# Patient Record
Sex: FEMALE | Race: OTHER | NOT HISPANIC OR LATINO
[De-identification: names, ages, dates, MRNs, and addresses within clinical notes are randomized per-mention and may not be internally consistent; named-entity substitution may affect disease eponyms.]

---

## 2017-03-09 ENCOUNTER — APPOINTMENT (OUTPATIENT)
Dept: INTERNAL MEDICINE | Facility: CLINIC | Age: 32
End: 2017-03-09

## 2017-04-03 ENCOUNTER — TRANSCRIPTION ENCOUNTER (OUTPATIENT)
Age: 32
End: 2017-04-03

## 2017-04-16 ENCOUNTER — TRANSCRIPTION ENCOUNTER (OUTPATIENT)
Age: 32
End: 2017-04-16

## 2017-04-17 ENCOUNTER — APPOINTMENT (OUTPATIENT)
Dept: INTERNAL MEDICINE | Facility: CLINIC | Age: 32
End: 2017-04-17

## 2017-12-05 ENCOUNTER — APPOINTMENT (OUTPATIENT)
Dept: OBGYN | Facility: CLINIC | Age: 32
End: 2017-12-05
Payer: MEDICAID

## 2017-12-05 ENCOUNTER — ASOB RESULT (OUTPATIENT)
Age: 32
End: 2017-12-05

## 2017-12-05 VITALS
DIASTOLIC BLOOD PRESSURE: 71 MMHG | HEIGHT: 61 IN | SYSTOLIC BLOOD PRESSURE: 114 MMHG | BODY MASS INDEX: 29.64 KG/M2 | WEIGHT: 157 LBS | HEART RATE: 76 BPM

## 2017-12-05 PROCEDURE — 76830 TRANSVAGINAL US NON-OB: CPT

## 2017-12-05 PROCEDURE — 99203 OFFICE O/P NEW LOW 30 MIN: CPT

## 2017-12-06 LAB
BASOPHILS # BLD AUTO: 0.03 K/UL
BASOPHILS NFR BLD AUTO: 0.3 %
C TRACH RRNA SPEC QL NAA+PROBE: NOT DETECTED
EOSINOPHIL # BLD AUTO: 0.64 K/UL
EOSINOPHIL NFR BLD AUTO: 6.5 %
HCG SERPL-MCNC: <1 MIU/ML
HCT VFR BLD CALC: 37.1 %
HGB BLD-MCNC: 11.7 G/DL
HPV HIGH+LOW RISK DNA PNL CVX: NOT DETECTED
IMM GRANULOCYTES NFR BLD AUTO: 0.2 %
LYMPHOCYTES # BLD AUTO: 4.03 K/UL
LYMPHOCYTES NFR BLD AUTO: 41.1 %
MAN DIFF?: NORMAL
MCHC RBC-ENTMCNC: 25.8 PG
MCHC RBC-ENTMCNC: 31.5 GM/DL
MCV RBC AUTO: 81.9 FL
MONOCYTES # BLD AUTO: 0.38 K/UL
MONOCYTES NFR BLD AUTO: 3.9 %
N GONORRHOEA RRNA SPEC QL NAA+PROBE: NOT DETECTED
NEUTROPHILS # BLD AUTO: 4.71 K/UL
NEUTROPHILS NFR BLD AUTO: 48 %
PLATELET # BLD AUTO: 386 K/UL
PROLACTIN SERPL-MCNC: 5.5 NG/ML
RBC # BLD: 4.53 M/UL
RBC # FLD: 14.7 %
SOURCE TP AMPLIFICATION: NORMAL
TSH SERPL-ACNC: 1.55 UIU/ML
WBC # FLD AUTO: 9.81 K/UL

## 2017-12-10 LAB — CYTOLOGY CVX/VAG DOC THIN PREP: NORMAL

## 2018-01-11 ENCOUNTER — APPOINTMENT (OUTPATIENT)
Dept: OBGYN | Facility: CLINIC | Age: 33
End: 2018-01-11

## 2018-08-22 ENCOUNTER — APPOINTMENT (OUTPATIENT)
Dept: OBGYN | Facility: CLINIC | Age: 33
End: 2018-08-22

## 2018-08-23 ENCOUNTER — FORM ENCOUNTER (OUTPATIENT)
Age: 33
End: 2018-08-23

## 2018-08-24 ENCOUNTER — APPOINTMENT (OUTPATIENT)
Dept: MAMMOGRAPHY | Facility: IMAGING CENTER | Age: 33
End: 2018-08-24
Payer: COMMERCIAL

## 2018-08-24 ENCOUNTER — APPOINTMENT (OUTPATIENT)
Dept: ULTRASOUND IMAGING | Facility: IMAGING CENTER | Age: 33
End: 2018-08-24
Payer: COMMERCIAL

## 2018-08-24 ENCOUNTER — OUTPATIENT (OUTPATIENT)
Dept: OUTPATIENT SERVICES | Facility: HOSPITAL | Age: 33
LOS: 1 days | End: 2018-08-24
Payer: MEDICAID

## 2018-08-24 DIAGNOSIS — Z98.89 OTHER SPECIFIED POSTPROCEDURAL STATES: Chronic | ICD-10-CM

## 2018-08-24 PROCEDURE — 76641 ULTRASOUND BREAST COMPLETE: CPT

## 2018-08-24 PROCEDURE — 76641 ULTRASOUND BREAST COMPLETE: CPT | Mod: 26,50

## 2018-08-25 DIAGNOSIS — Z00.8 ENCOUNTER FOR OTHER GENERAL EXAMINATION: ICD-10-CM

## 2018-11-06 ENCOUNTER — APPOINTMENT (OUTPATIENT)
Dept: ULTRASOUND IMAGING | Facility: CLINIC | Age: 33
End: 2018-11-06

## 2018-11-06 ENCOUNTER — APPOINTMENT (OUTPATIENT)
Dept: MAMMOGRAPHY | Facility: CLINIC | Age: 33
End: 2018-11-06

## 2018-11-16 ENCOUNTER — APPOINTMENT (OUTPATIENT)
Dept: OBGYN | Facility: CLINIC | Age: 33
End: 2018-11-16
Payer: COMMERCIAL

## 2018-11-16 ENCOUNTER — APPOINTMENT (OUTPATIENT)
Dept: OBGYN | Facility: CLINIC | Age: 33
End: 2018-11-16

## 2018-11-16 VITALS
HEART RATE: 91 BPM | WEIGHT: 159 LBS | SYSTOLIC BLOOD PRESSURE: 131 MMHG | HEIGHT: 61 IN | DIASTOLIC BLOOD PRESSURE: 83 MMHG | BODY MASS INDEX: 30.02 KG/M2

## 2018-11-16 PROCEDURE — 99213 OFFICE O/P EST LOW 20 MIN: CPT

## 2019-03-27 ENCOUNTER — APPOINTMENT (OUTPATIENT)
Dept: OBGYN | Facility: CLINIC | Age: 34
End: 2019-03-27
Payer: COMMERCIAL

## 2019-03-27 VITALS
HEART RATE: 79 BPM | DIASTOLIC BLOOD PRESSURE: 72 MMHG | SYSTOLIC BLOOD PRESSURE: 107 MMHG | WEIGHT: 145 LBS | HEIGHT: 61 IN | BODY MASS INDEX: 27.38 KG/M2

## 2019-03-27 PROCEDURE — 99214 OFFICE O/P EST MOD 30 MIN: CPT

## 2019-03-27 RX ORDER — NORETHINDRONE ACETATE AND ETHINYL ESTRADIOL AND FERROUS FUMARATE 1.5-30(21)
1.5-3 KIT ORAL DAILY
Qty: 30 | Refills: 3 | Status: DISCONTINUED | COMMUNITY
Start: 2018-11-16 | End: 2019-03-27

## 2019-03-27 NOTE — PHYSICAL EXAM
[Awake] : awake [Alert] : alert [Soft] : soft [Oriented x3] : oriented to person, place, and time [No Lesions] : no genitalia lesions [Normal] : cervix [Labia Majora] : labia major [Labia Minora] : labia minora [Scant] : there was scant vaginal bleeding [Pap Obtained] : a Pap smear was performed [Acute Distress] : no acute distress [Mass] : no breast mass [Nipple Discharge] : no nipple discharge [Axillary LAD] : no axillary lymphadenopathy [Tender] : non tender [Distended] : not distended [Depressed Mood] : not depressed [Flat Affect] : affect not flat [Motion Tenderness] : there was no cervical motion tenderness [Tenderness] : nontender [Adnexa Tenderness] : were not tender [FreeTextEntry4] : brown discharge in vault

## 2019-03-27 NOTE — HISTORY OF PRESENT ILLNESS
[___ Year(s) Ago] : [unfilled] year(s) ago [Last Pap ___] : Last cervical pap smear was [unfilled] [Healthy Diet] : a healthy diet [Weight Concerns] : weight concens [Overweight] : overweight [Regular Exercise] : not exercising regularly

## 2019-03-27 NOTE — COUNSELING
[Breast Self Exam] : breast self exam [Nutrition] : nutrition [Exercise] : exercise [Menstrual Calendar] : menstrual calendar [Contraception] : contraception [Weight Management] : weight management

## 2019-03-28 ENCOUNTER — OTHER (OUTPATIENT)
Age: 34
End: 2019-03-28

## 2019-03-28 LAB
C TRACH RRNA SPEC QL NAA+PROBE: NOT DETECTED
CANDIDA VAG CYTO: NOT DETECTED
G VAGINALIS+PREV SP MTYP VAG QL MICRO: DETECTED
N GONORRHOEA RRNA SPEC QL NAA+PROBE: NOT DETECTED
SOURCE AMPLIFICATION: NORMAL
T VAGINALIS VAG QL WET PREP: NOT DETECTED

## 2019-04-02 ENCOUNTER — APPOINTMENT (OUTPATIENT)
Dept: ULTRASOUND IMAGING | Facility: CLINIC | Age: 34
End: 2019-04-02
Payer: COMMERCIAL

## 2019-04-02 ENCOUNTER — OUTPATIENT (OUTPATIENT)
Dept: OUTPATIENT SERVICES | Facility: HOSPITAL | Age: 34
LOS: 1 days | End: 2019-04-02
Payer: COMMERCIAL

## 2019-04-02 DIAGNOSIS — Z98.89 OTHER SPECIFIED POSTPROCEDURAL STATES: Chronic | ICD-10-CM

## 2019-04-02 DIAGNOSIS — N92.0 EXCESSIVE AND FREQUENT MENSTRUATION WITH REGULAR CYCLE: ICD-10-CM

## 2019-04-02 PROCEDURE — 76830 TRANSVAGINAL US NON-OB: CPT | Mod: 26

## 2019-04-02 PROCEDURE — 76830 TRANSVAGINAL US NON-OB: CPT

## 2019-04-04 ENCOUNTER — OTHER (OUTPATIENT)
Age: 34
End: 2019-04-04

## 2019-04-05 ENCOUNTER — OTHER (OUTPATIENT)
Age: 34
End: 2019-04-05

## 2019-04-05 LAB
CYTOLOGY CVX/VAG DOC THIN PREP: NORMAL
HPV HIGH+LOW RISK DNA PNL CVX: NOT DETECTED

## 2019-11-06 ENCOUNTER — APPOINTMENT (OUTPATIENT)
Dept: INTERNAL MEDICINE | Facility: CLINIC | Age: 34
End: 2019-11-06
Payer: COMMERCIAL

## 2019-11-06 VITALS
DIASTOLIC BLOOD PRESSURE: 60 MMHG | RESPIRATION RATE: 14 BRPM | BODY MASS INDEX: 24.92 KG/M2 | SYSTOLIC BLOOD PRESSURE: 102 MMHG | HEIGHT: 61 IN | WEIGHT: 132 LBS | HEART RATE: 89 BPM | TEMPERATURE: 98.3 F | OXYGEN SATURATION: 98 %

## 2019-11-06 DIAGNOSIS — Z87.898 PERSONAL HISTORY OF OTHER SPECIFIED CONDITIONS: ICD-10-CM

## 2019-11-06 DIAGNOSIS — N76.0 ACUTE VAGINITIS: ICD-10-CM

## 2019-11-06 DIAGNOSIS — Z87.42 PERSONAL HISTORY OF OTHER DISEASES OF THE FEMALE GENITAL TRACT: ICD-10-CM

## 2019-11-06 DIAGNOSIS — N90.89 OTHER SPECIFIED NONINFLAMMATORY DISORDERS OF VULVA AND PERINEUM: ICD-10-CM

## 2019-11-06 DIAGNOSIS — B96.89 ACUTE VAGINITIS: ICD-10-CM

## 2019-11-06 DIAGNOSIS — Z82.61 FAMILY HISTORY OF ARTHRITIS: ICD-10-CM

## 2019-11-06 PROCEDURE — G0444 DEPRESSION SCREEN ANNUAL: CPT

## 2019-11-06 PROCEDURE — 99214 OFFICE O/P EST MOD 30 MIN: CPT

## 2019-11-06 PROCEDURE — 99204 OFFICE O/P NEW MOD 45 MIN: CPT

## 2019-11-06 RX ORDER — TRIAMCINOLONE ACETONIDE 0.25 MG/G
0.03 CREAM TOPICAL
Qty: 1 | Refills: 0 | Status: DISCONTINUED | COMMUNITY
Start: 2019-03-27 | End: 2019-11-06

## 2019-11-06 RX ORDER — METRONIDAZOLE 7.5 MG/G
0.75 GEL VAGINAL
Qty: 1 | Refills: 0 | Status: DISCONTINUED | COMMUNITY
Start: 2019-03-28 | End: 2019-11-06

## 2019-11-06 NOTE — PAST MEDICAL HISTORY
[Menarche Age ____] : age at menarche was [unfilled] [Definite ___ (Date)] : the last menstrual period was [unfilled] [Normal Duration] : the duration was normal [Regular Cycle Intervals] : have been regular [Dysmenorrhea] : dysmenorrhea [Total Preg ___] : G: [unfilled] [Living ___] : Living: [unfilled]

## 2019-11-10 PROBLEM — N90.89 VULVAR IRRITATION: Status: RESOLVED | Noted: 2019-03-27 | Resolved: 2019-11-10

## 2019-11-10 PROBLEM — Z87.42 HISTORY OF MENORRHAGIA: Status: RESOLVED | Noted: 2017-12-05 | Resolved: 2019-11-10

## 2019-11-10 PROBLEM — N76.0 BACTERIAL VAGINOSIS: Status: RESOLVED | Noted: 2019-03-28 | Resolved: 2019-11-10

## 2019-11-10 PROBLEM — Z87.898 HISTORY OF LUMP OF LEFT BREAST: Status: RESOLVED | Noted: 2017-12-05 | Resolved: 2019-11-10

## 2019-11-10 NOTE — HISTORY OF PRESENT ILLNESS
[Spouse] : spouse [___ Daughter(s)] : [unfilled] daughter(s) [___ Son(s)] : [unfilled] son(s) [] :  [Working Full Time] : working full time [Occupation ___] : occupation: [unfilled] [Never Smoked Cigarettes] : has never smoked cigarettes [Never] : has never used illicit drugs [Good] : good [Reg. Dental Visits] : She has regular dental visits [Premenopausal] : the patient is premenopausal [Never Performed] : no previous mammogram [Performed: ___] : a clinical breast exam performed [unfilled] [Performed Irregular] : irregular breast self-exams performed [No Previous CRC Screening] : She hasn't been previously screened for colorectal cancer [Hearing Loss] : She denies hearing loss [Vision Problems] : She denies vision problems [de-identified] : declines flu shots, hx Tdap 2015 [FreeTextEntry1] : \par 33 y F preDM, anemia, CTS here for f/u\par \par Last seen in office 12/16.\par Reports last CPE was 6mo ago with prior PMD, labs then nl except low vit.  Does not plan to return there.\par \par Not fasting- wants slip for labs\par \par \par c/o mild hair loss - denies coloring hair or pulling\par \par c/o anal itching- x 2 yrs\par -hx doctor eval few months ago, given topical (? name) using with help, but sx's recur with stopping\par -feels need to constantly scratch it\par -denies rectal pain, hx hemorrhoids or rash\par \par Reports nl appetite and BMs, denies melena or BRBPR\par Reports  intentionally lost with ~ 20 lbs since 1/19 with healthier diet, lower in carbs and exercise\par \par preDM-\par -eats smaller portions, less carbs\par -exercise: walks (with video) x 45 mins 5x/wk- done w/o sx's or limitations\par \par hx dysmenorrhea, AUB- \par -off iron 2016, told by prior PMD in 2016 iron was nl.  Last lab check 2018- told nl\par -followed by GYN, seen 3/19 with negative PAP and advised to get labs cbc/TSH with PMD visit.  TV sono pending\par \par hx vit d- on weekly tx x 6 mo now per prior PMD, states taking since lab done by PMD when told low\par \par \par Denies urinary complaints.\par \par Denies depression or anxiety.\par

## 2019-11-10 NOTE — HEALTH RISK ASSESSMENT
[0] : 2) Feeling down, depressed, or hopeless: Not at all (0) [Patient reported PAP Smear was normal] : Patient reported PAP Smear was normal [HIV Test offered] : HIV Test offered [Smoke Detector] : smoke detector [Carbon Monoxide Detector] : carbon monoxide detector [Seat Belt] :  uses seat belt [Sunscreen] : uses sunscreen [Guns at Home] : no guns at home [PapSmearDate] : 03/19

## 2019-11-10 NOTE — PHYSICAL EXAM
[General Appearance - Alert] : alert [General Appearance - In No Acute Distress] : in no acute distress [General Appearance - Well-Appearing] : healthy appearing [Sclera] : the sclera and conjunctiva were normal [PERRL With Normal Accommodation] : pupils were equal in size, round, and reactive to light [Extraocular Movements] : extraocular movements were intact [Outer Ear] : the ears and nose were normal in appearance [Nasal Cavity] : the nasal mucosa and septum were normal [Oropharynx] : the oropharynx was normal [Neck Appearance] : the appearance of the neck was normal [Thyroid Diffuse Enlargement] : the thyroid was not enlarged [Thyroid Nodule] : there were no palpable thyroid nodules [Respiration, Rhythm And Depth] : normal respiratory rhythm and effort [Auscultation Breath Sounds / Voice Sounds] : lungs were clear to auscultation bilaterally [Heart Rate And Rhythm] : heart rate was normal and rhythm regular [Heart Sounds] : normal S1 and S2 [Murmurs] : no murmurs [Full Pulse] : the pedal pulses are present [Edema] : there was no peripheral edema [Bowel Sounds] : normal bowel sounds [Abdomen Soft] : soft [Abdomen Tenderness] : non-tender [Cervical Lymph Nodes Enlarged Posterior Bilaterally] : posterior cervical [Cervical Lymph Nodes Enlarged Anterior Bilaterally] : anterior cervical [Supraclavicular Lymph Nodes Enlarged Bilaterally] : supraclavicular [No CVA Tenderness] : no ~M costovertebral angle tenderness [No Spinal Tenderness] : no spinal tenderness [Abnormal Walk] : normal gait [Musculoskeletal - Swelling] : no joint swelling seen [No Focal Deficits] : no focal deficits [Oriented To Time, Place, And Person] : oriented to person, place, and time [Affect] : the affect was normal [Mood] : the mood was normal [Normal Sphincter Tone] : normal sphincter tone [No Stool to Guaiac] : no stool to guaiac [No Mass] : no mass [Both Tympanic Membranes Were Examined] : both tympanic membranes were normal [] : no hepato-splenomegaly [FreeTextEntry1] : scattered freckles

## 2019-11-10 NOTE — HEALTH RISK ASSESSMENT
[0] : 2) Feeling down, depressed, or hopeless: Not at all (0) [Patient reported PAP Smear was normal] : Patient reported PAP Smear was normal [HIV Test offered] : HIV Test offered [Smoke Detector] : smoke detector [Carbon Monoxide Detector] : carbon monoxide detector [Seat Belt] :  uses seat belt [Sunscreen] : uses sunscreen [PapSmearDate] : 03/19 [Guns at Home] : no guns at home

## 2019-11-10 NOTE — ASSESSMENT
[FreeTextEntry1] : \par 33 y F preDM, anemia, CTS here for f/u\par \par anal itching- rectal exam unremarkable, ? internal hemorrhoids\par -hygiene counseled\par -GI referral for eval\par -advised trial of prep-H or Tuck's pads prn\par \par hair loss-\par -check cbc/iron/TSH\par -derm referral for eval, also advised skin screening exam.  Regular use of sun block for skin cancer prevention counseled.\par -advised start biotin OTC\par \par hx dysmenorrhea, AUB, anemia- asx\par -off iron since 2016 per prior PMD when told labs wnl per pt\par -followed by GYN, seen 3/19 with negative PAP and TV sono pending. \par -check cbc/iron/TSH\par \par preDM- 11/16 A1c 5.9; hx intentional wt loss ~ 20 lbs\par -ADA diet, exercise advised\par -check A1c \par \par hx vit d def- on weekly tx course x 6mo now since last labs done pe pt\par -check level\par \par chest wall deformity- asx, denies hx trauma\par -check cxr\par \par \par HCM\par -hx CPE 5/19 by prior PMD, yearly advised\par -check screening labs; agreeable to HIV/STD screening\par -declines flu shot\par -hx Tdap 2015\par -hx negative PAP/HPV 3/19 by GYN \par -hx optho eval 2019, has new glasses\par \par Pt's cell: [phone]

## 2019-11-10 NOTE — PHYSICAL EXAM
[General Appearance - Alert] : alert [General Appearance - In No Acute Distress] : in no acute distress [General Appearance - Well-Appearing] : healthy appearing [Sclera] : the sclera and conjunctiva were normal [PERRL With Normal Accommodation] : pupils were equal in size, round, and reactive to light [Extraocular Movements] : extraocular movements were intact [Outer Ear] : the ears and nose were normal in appearance [Nasal Cavity] : the nasal mucosa and septum were normal [Oropharynx] : the oropharynx was normal [Neck Appearance] : the appearance of the neck was normal [Thyroid Diffuse Enlargement] : the thyroid was not enlarged [Thyroid Nodule] : there were no palpable thyroid nodules [Respiration, Rhythm And Depth] : normal respiratory rhythm and effort [Auscultation Breath Sounds / Voice Sounds] : lungs were clear to auscultation bilaterally [Heart Rate And Rhythm] : heart rate was normal and rhythm regular [Heart Sounds] : normal S1 and S2 [Murmurs] : no murmurs [Full Pulse] : the pedal pulses are present [Edema] : there was no peripheral edema [Bowel Sounds] : normal bowel sounds [Abdomen Soft] : soft [Abdomen Tenderness] : non-tender [Cervical Lymph Nodes Enlarged Posterior Bilaterally] : posterior cervical [Cervical Lymph Nodes Enlarged Anterior Bilaterally] : anterior cervical [Supraclavicular Lymph Nodes Enlarged Bilaterally] : supraclavicular [No CVA Tenderness] : no ~M costovertebral angle tenderness [No Spinal Tenderness] : no spinal tenderness [Abnormal Walk] : normal gait [Musculoskeletal - Swelling] : no joint swelling seen [No Focal Deficits] : no focal deficits [Oriented To Time, Place, And Person] : oriented to person, place, and time [Affect] : the affect was normal [Mood] : the mood was normal [No Stool to Guaiac] : no stool to guaiac [Normal Sphincter Tone] : normal sphincter tone [No Mass] : no mass [Both Tympanic Membranes Were Examined] : both tympanic membranes were normal [] : no hepato-splenomegaly [FreeTextEntry1] : scattered freckles

## 2019-11-10 NOTE — ASSESSMENT
[FreeTextEntry1] : \par 33 y F preDM, anemia, CTS here for f/u\par \par anal itching- rectal exam unremarkable, ? internal hemorrhoids\par -hygiene counseled\par -GI referral for eval\par -advised trial of prep-H or Tuck's pads prn\par \par hair loss-\par -check cbc/iron/TSH\par -derm referral for eval, also advised skin screening exam.  Regular use of sun block for skin cancer prevention counseled.\par -advised start biotin OTC\par \par hx dysmenorrhea, AUB, anemia- asx\par -off iron since 2016 per prior PMD when told labs wnl per pt\par -followed by GYN, seen 3/19 with negative PAP and TV sono pending. \par -check cbc/iron/TSH\par \par preDM- 11/16 A1c 5.9; hx intentional wt loss ~ 20 lbs\par -ADA diet, exercise advised\par -check A1c \par \par hx vit d def- on weekly tx course x 6mo now since last labs done pe pt\par -check level\par \par chest wall deformity- asx, denies hx trauma\par -check cxr\par \par \par HCM\par -hx CPE 5/19 by prior PMD, yearly advised\par -check screening labs; agreeable to HIV/STD screening\par -declines flu shot\par -hx Tdap 2015\par -hx negative PAP/HPV 3/19 by GYN \par -hx optho eval 2019, has new glasses\par \par Pt's cell: [phone]

## 2019-11-10 NOTE — HISTORY OF PRESENT ILLNESS
[Spouse] : spouse [___ Daughter(s)] : [unfilled] daughter(s) [___ Son(s)] : [unfilled] son(s) [] :  [Working Full Time] : working full time [Occupation ___] : occupation: [unfilled] [Never Smoked Cigarettes] : has never smoked cigarettes [Never] : has never used illicit drugs [Good] : good [Reg. Dental Visits] : She has regular dental visits [Premenopausal] : the patient is premenopausal [Never Performed] : no previous mammogram [Performed: ___] : a clinical breast exam performed [unfilled] [Performed Irregular] : irregular breast self-exams performed [No Previous CRC Screening] : She hasn't been previously screened for colorectal cancer [Hearing Loss] : She denies hearing loss [Vision Problems] : She denies vision problems [de-identified] : declines flu shots, hx Tdap 2015 [FreeTextEntry1] : \par 33 y F preDM, anemia, CTS here for f/u\par \par Last seen in office 12/16.\par Reports last CPE was 6mo ago with prior PMD, labs then nl except low vit.  Does not plan to return there.\par \par Not fasting- wants slip for labs\par \par \par c/o mild hair loss - denies coloring hair or pulling\par \par c/o anal itching- x 2 yrs\par -hx doctor eval few months ago, given topical (? name) using with help, but sx's recur with stopping\par -feels need to constantly scratch it\par -denies rectal pain, hx hemorrhoids or rash\par \par Reports nl appetite and BMs, denies melena or BRBPR\par Reports  intentionally lost with ~ 20 lbs since 1/19 with healthier diet, lower in carbs and exercise\par \par preDM-\par -eats smaller portions, less carbs\par -exercise: walks (with video) x 45 mins 5x/wk- done w/o sx's or limitations\par \par hx dysmenorrhea, AUB- \par -off iron 2016, told by prior PMD in 2016 iron was nl.  Last lab check 2018- told nl\par -followed by GYN, seen 3/19 with negative PAP and advised to get labs cbc/TSH with PMD visit.  TV sono pending\par \par hx vit d- on weekly tx x 6 mo now per prior PMD, states taking since lab done by PMD when told low\par \par \par Denies urinary complaints.\par \par Denies depression or anxiety.\par

## 2019-11-10 NOTE — REVIEW OF SYSTEMS
[see HPI] : see HPI [Negative] : Neurological [Fever] : no fever [Chills] : no chills [Feeling Tired] : not feeling tired [Chest Pain] : no chest pain [Palpitations] : no palpitations [Cough] : no cough [SOB on Exertion] : no shortness of breath during exertion [Lower Ext Edema] : no lower extremity edema [Dysuria] : no dysuria [Dizziness] : no dizziness

## 2019-11-10 NOTE — REVIEW OF SYSTEMS
[see HPI] : see HPI [Negative] : Neurological [Fever] : no fever [Chills] : no chills [Palpitations] : no palpitations [Feeling Tired] : not feeling tired [Chest Pain] : no chest pain [SOB on Exertion] : no shortness of breath during exertion [Lower Ext Edema] : no lower extremity edema [Cough] : no cough [Dysuria] : no dysuria [Dizziness] : no dizziness

## 2019-11-12 LAB
25(OH)D3 SERPL-MCNC: 21.2 NG/ML
ALBUMIN SERPL ELPH-MCNC: 4.3 G/DL
ALP BLD-CCNC: 67 U/L
ALT SERPL-CCNC: 16 U/L
ANION GAP SERPL CALC-SCNC: 13 MMOL/L
AST SERPL-CCNC: 15 U/L
BASOPHILS # BLD AUTO: 0.07 K/UL
BASOPHILS NFR BLD AUTO: 1 %
BILIRUB SERPL-MCNC: 0.6 MG/DL
BUN SERPL-MCNC: 9 MG/DL
C TRACH RRNA SPEC QL NAA+PROBE: NOT DETECTED
CALCIUM SERPL-MCNC: 9.1 MG/DL
CHLORIDE SERPL-SCNC: 104 MMOL/L
CHOLEST SERPL-MCNC: 158 MG/DL
CHOLEST/HDLC SERPL: 2.9 RATIO
CO2 SERPL-SCNC: 24 MMOL/L
CREAT SERPL-MCNC: 0.65 MG/DL
EOSINOPHIL # BLD AUTO: 0.74 K/UL
EOSINOPHIL NFR BLD AUTO: 10.6 %
ESTIMATED AVERAGE GLUCOSE: 114 MG/DL
FERRITIN SERPL-MCNC: 10 NG/ML
FOLATE SERPL-MCNC: 6.6 NG/ML
GLUCOSE SERPL-MCNC: 92 MG/DL
HBA1C MFR BLD HPLC: 5.6 %
HBV CORE IGG+IGM SER QL: NONREACTIVE
HBV SURFACE AB SER QL: NONREACTIVE
HBV SURFACE AG SER QL: NONREACTIVE
HCT VFR BLD CALC: 33.5 %
HCV AB SER QL: NONREACTIVE
HCV S/CO RATIO: 0.14 S/CO
HDLC SERPL-MCNC: 55 MG/DL
HGB BLD-MCNC: 10.3 G/DL
HIV1+2 AB SPEC QL IA.RAPID: NONREACTIVE
IMM GRANULOCYTES NFR BLD AUTO: 0.1 %
IRON SATN MFR SERPL: 10 %
IRON SERPL-MCNC: 38 UG/DL
LDLC SERPL CALC-MCNC: 89 MG/DL
LYMPHOCYTES # BLD AUTO: 2.82 K/UL
LYMPHOCYTES NFR BLD AUTO: 40.5 %
MAN DIFF?: NORMAL
MCHC RBC-ENTMCNC: 25.4 PG
MCHC RBC-ENTMCNC: 30.7 GM/DL
MCV RBC AUTO: 82.5 FL
MONOCYTES # BLD AUTO: 0.26 K/UL
MONOCYTES NFR BLD AUTO: 3.7 %
N GONORRHOEA RRNA SPEC QL NAA+PROBE: NOT DETECTED
NEUTROPHILS # BLD AUTO: 3.06 K/UL
NEUTROPHILS NFR BLD AUTO: 44.1 %
PLATELET # BLD AUTO: 342 K/UL
POTASSIUM SERPL-SCNC: 4.5 MMOL/L
PROT SERPL-MCNC: 7.1 G/DL
RBC # BLD: 4.06 M/UL
RBC # FLD: 14.6 %
SODIUM SERPL-SCNC: 140 MMOL/L
SOURCE AMPLIFICATION: NORMAL
T PALLIDUM AB SER QL IA: NEGATIVE
TIBC SERPL-MCNC: 365 UG/DL
TRIGL SERPL-MCNC: 70 MG/DL
TSH SERPL-ACNC: 1.08 UIU/ML
UIBC SERPL-MCNC: 327 UG/DL
VIT B12 SERPL-MCNC: 744 PG/ML
WBC # FLD AUTO: 6.96 K/UL

## 2019-12-03 LAB — HEMOCCULT STL QL IA: NEGATIVE

## 2019-12-17 ENCOUNTER — APPOINTMENT (OUTPATIENT)
Dept: GASTROENTEROLOGY | Facility: CLINIC | Age: 34
End: 2019-12-17
Payer: COMMERCIAL

## 2019-12-17 VITALS
SYSTOLIC BLOOD PRESSURE: 100 MMHG | DIASTOLIC BLOOD PRESSURE: 60 MMHG | WEIGHT: 136 LBS | HEIGHT: 61 IN | TEMPERATURE: 98.8 F | BODY MASS INDEX: 25.68 KG/M2

## 2019-12-17 PROCEDURE — 99204 OFFICE O/P NEW MOD 45 MIN: CPT

## 2019-12-17 NOTE — PHYSICAL EXAM
[General Appearance - Alert] : alert [General Appearance - In No Acute Distress] : in no acute distress [General Appearance - Well Nourished] : well nourished [General Appearance - Well Developed] : well developed [General Appearance - Well-Appearing] : healthy appearing [Sclera] : the sclera and conjunctiva were normal [Respiration, Rhythm And Depth] : normal respiratory rhythm and effort [Auscultation Breath Sounds / Voice Sounds] : lungs were clear to auscultation bilaterally [Heart Rate And Rhythm] : heart rate was normal and rhythm regular [Heart Sounds] : normal S1 and S2 [Bowel Sounds] : normal bowel sounds [Abdomen Soft] : soft [Abdomen Tenderness] : non-tender [No Rectal Mass] : no rectal mass [Internal Hemorrhoid] : internal hemorrhoids [No CVA Tenderness] : no ~M costovertebral angle tenderness [Abnormal Walk] : normal gait [] : no rash [Sensation] : the sensory exam was normal to light touch and pinprick [Skin Lesions] : no skin lesions [No Focal Deficits] : no focal deficits [Motor Exam] : the motor exam was normal [Oriented To Time, Place, And Person] : oriented to person, place, and time [Occult Blood Positive] : stool was negative for occult blood [FreeTextEntry1] : dry scaly skin perianal

## 2019-12-17 NOTE — REVIEW OF SYSTEMS
[As Noted in HPI] : as noted in HPI [Fever] : no fever [Chills] : no chills [Red Eyes] : eyes not red [Loss Of Hearing] : no hearing loss [Cough] : no cough [Chest Pain] : no chest pain [Pelvic Pain] : no pelvic pain [SOB on Exertion] : no shortness of breath during exertion [Joint Pain] : no joint pain [Arthralgias] : no arthralgias [Anxiety] : no anxiety [Skin Lesions] : no skin lesions [Depression] : no depression [Muscle Weakness] : no muscle weakness [Easy Bruising] : no tendency for easy bruising [Easy Bleeding] : no tendency for easy bleeding

## 2019-12-17 NOTE — ASSESSMENT
[FreeTextEntry1] : 1. rectal itching  internally may be due to hemorrhoids, external itching probably due to eczema.\par \par plan recommend anusol pr bid\par        recommend dermatology evaluation,try aquafor, diaper creme\par \par 2. anemia,iron deficiency h/o constipation, anemia probable due to menstruation , given gi symptoms recommend colonsocopy to r/o polyp,mass etc.\par \par Discussed risks including but not limited to bleeding,infection,drug reaction, perforation,missed lesion,benefits and alternatives of colonoscopy/egd with patient  including no\par treatment and patient consents to procedure.\par \par plan colonoscopy to be scheduled

## 2019-12-17 NOTE — HISTORY OF PRESENT ILLNESS
[FreeTextEntry1] : 33 yo female with c/o rectal itching for 2 years, was given cream in the past and helped does not know the name. patient reports constipation, have a bm every 2 days,brown, no brbpr, no melena. no abdominal pain, no weight loss. no n/v. no gerd.  no recent travel.\par \par never had colonoscopy

## 2020-01-06 ENCOUNTER — APPOINTMENT (OUTPATIENT)
Dept: DERMATOLOGY | Facility: CLINIC | Age: 35
End: 2020-01-06
Payer: COMMERCIAL

## 2020-01-06 VITALS — WEIGHT: 132 LBS | HEIGHT: 61 IN | BODY MASS INDEX: 24.92 KG/M2

## 2020-01-06 PROCEDURE — 99203 OFFICE O/P NEW LOW 30 MIN: CPT

## 2020-01-10 ENCOUNTER — APPOINTMENT (OUTPATIENT)
Dept: GASTROENTEROLOGY | Facility: CLINIC | Age: 35
End: 2020-01-10

## 2020-01-28 ENCOUNTER — APPOINTMENT (OUTPATIENT)
Dept: INTERNAL MEDICINE | Facility: CLINIC | Age: 35
End: 2020-01-28

## 2020-02-18 ENCOUNTER — APPOINTMENT (OUTPATIENT)
Dept: INTERNAL MEDICINE | Facility: CLINIC | Age: 35
End: 2020-02-18
Payer: COMMERCIAL

## 2020-02-18 VITALS
SYSTOLIC BLOOD PRESSURE: 98 MMHG | RESPIRATION RATE: 14 BRPM | DIASTOLIC BLOOD PRESSURE: 60 MMHG | HEART RATE: 78 BPM | WEIGHT: 138 LBS | BODY MASS INDEX: 26.06 KG/M2 | HEIGHT: 61 IN | TEMPERATURE: 97.8 F | OXYGEN SATURATION: 99 %

## 2020-02-18 DIAGNOSIS — Z86.39 PERSONAL HISTORY OF OTHER ENDOCRINE, NUTRITIONAL AND METABOLIC DISEASE: ICD-10-CM

## 2020-02-18 DIAGNOSIS — Z11.3 ENCOUNTER FOR SCREENING FOR INFECTIONS WITH A PREDOMINANTLY SEXUAL MODE OF TRANSMISSION: ICD-10-CM

## 2020-02-18 LAB
BASOPHILS # BLD AUTO: 0.07 K/UL
BASOPHILS NFR BLD AUTO: 0.8 %
EOSINOPHIL # BLD AUTO: 0.7 K/UL
EOSINOPHIL NFR BLD AUTO: 8.2 %
FERRITIN SERPL-MCNC: 20 NG/ML
HCT VFR BLD CALC: 37.3 %
HGB BLD-MCNC: 11.7 G/DL
IMM GRANULOCYTES NFR BLD AUTO: 0.3 %
IRON SATN MFR SERPL: 15 %
IRON SERPL-MCNC: 51 UG/DL
LYMPHOCYTES # BLD AUTO: 3.13 K/UL
LYMPHOCYTES NFR BLD AUTO: 36.5 %
MAN DIFF?: NORMAL
MCHC RBC-ENTMCNC: 26.5 PG
MCHC RBC-ENTMCNC: 31.4 GM/DL
MCV RBC AUTO: 84.6 FL
MONOCYTES # BLD AUTO: 0.47 K/UL
MONOCYTES NFR BLD AUTO: 5.5 %
NEUTROPHILS # BLD AUTO: 4.18 K/UL
NEUTROPHILS NFR BLD AUTO: 48.7 %
PLATELET # BLD AUTO: 292 K/UL
RBC # BLD: 4.41 M/UL
RBC # FLD: 15.7 %
TIBC SERPL-MCNC: 336 UG/DL
UIBC SERPL-MCNC: 284 UG/DL
WBC # FLD AUTO: 8.58 K/UL

## 2020-02-18 PROCEDURE — 99214 OFFICE O/P EST MOD 30 MIN: CPT | Mod: 25

## 2020-02-18 PROCEDURE — 36415 COLL VENOUS BLD VENIPUNCTURE: CPT

## 2020-02-18 RX ORDER — ERGOCALCIFEROL 1.25 MG/1
1.25 MG CAPSULE, LIQUID FILLED ORAL
Qty: 4 | Refills: 1 | Status: DISCONTINUED | COMMUNITY
End: 2020-02-18

## 2020-02-20 NOTE — ASSESSMENT
[FreeTextEntry1] : \par 34 y F preDM, anemia, CTS here for f/u\par \par anal itching- 11/19 rectal exam unremarkable, ? internal hemorrhoids; better with Vaseline use per pt\par -seen by GI 12/19 with anal itching thought possible hemorrhoids, external itching possible eczema.  Topical steroid given and advised colonoscopy- pending.  F/U pending.\par -hygiene counseled\par \par hair loss-\par -11/19 H/H 10.3/33, ferritin 10\par -11/19 TSH wnl\par -seen by derm 1/20, on trial of Rogain and advised f/u in 6mo\par -on iron supp\par -advised start biotin OTC\par \par hx dysmenorrhea, AUB, anemia- asx\par -on iron bid since 11/19, tolerating\par -followed by GI, seen 12/19- awaiting colonoscopy\par -followed by GYN, seen 3/19 with negative PAP and nl TV sono- f/u pending. \par -11/19 H/H 10.3/33, ferritin 10\par -11/19 TSH wnl\par -check cbc/iron studies\par \par preDM- 11/19 A1c 5.6 (was 5.9) - resolved; hx intentional wt loss ~ 20 lbs\par -ADA diet, exercise advised\par \par vit d insuff-\par -advised OTC vit d 1000u/d\par \par chest wall deformity- asx, denies hx trauma\par -cxr pending\par \par \par HCM\par -hx CPE 5/19 by prior PMD, yearly advised\par -declines flu shot\par -hx Tdap 2015\par -hx negative PAP/HPV 3/19 by GYN \par -hx optho eval 2019, has new glasses\par \par Pt's cell: [phone]

## 2020-02-20 NOTE — HISTORY OF PRESENT ILLNESS
[de-identified] : \par taking iron BID since 11/19- occ constipation, improved with fiber and water, no BRBPR or ectal pain\par c-scope pending\par no rectal cream used- itching better with vaseline\par \par started OTC rogain per derm 1 mo\par not on OTC vit  [FreeTextEntry1] : \par 34 y F preDM, anemia, CTS here for f/u\par \par \par hx mild hair loss - stable\par -denies coloring hair or pulling\par -seen by derm 1/20 with topical given and advised rogain and to f/u in 6mo\par \par hx anal itching, anemia- x 2 yrs, better with use of Vaseline\par -seen by GI 12/19 with anal itching thought possible hemorrhoids, external itching possible eczema.  Topical steroid given and advised colonoscopy- pending.  F/U pending.\par -taking iron BID since 11/19- notes has occasional constipation, improved with fiber and water, no BRBPR or rectal pain\par \par Reports nl appetite.\par Reports  intentionally lost with ~ 20 lbs since 1/19 with healthier diet, lower in carbs and exercise\par \par preDM-\par -eats smaller portions, less carbs\par -exercise: walks (with video) x 45 mins 5x/wk- done w/o sx's or limitations\par \par hx dysmenorrhea, AUB- \par -on iron\par -followed by GYN, seen 3/19 with negative PAP and advised to get labs cbc/TSH with PMD visit.  Told nl TV sono.  F/U pending.\par \par hx vit d- s/p tx, not on OTC supp \par \par \par Denies urinary complaints.\par

## 2020-02-20 NOTE — PHYSICAL EXAM
[General Appearance - Alert] : alert [General Appearance - In No Acute Distress] : in no acute distress [General Appearance - Well-Appearing] : healthy appearing [PERRL With Normal Accommodation] : pupils were equal in size, round, and reactive to light [Extraocular Movements] : extraocular movements were intact [Sclera] : the sclera and conjunctiva were normal [Outer Ear] : the ears and nose were normal in appearance [Oropharynx] : the oropharynx was normal [Nasal Cavity] : the nasal mucosa and septum were normal [Neck Appearance] : the appearance of the neck was normal [Thyroid Diffuse Enlargement] : the thyroid was not enlarged [Respiration, Rhythm And Depth] : normal respiratory rhythm and effort [Auscultation Breath Sounds / Voice Sounds] : lungs were clear to auscultation bilaterally [Heart Sounds] : normal S1 and S2 [Heart Rate And Rhythm] : heart rate was normal and rhythm regular [Full Pulse] : the pedal pulses are present [Murmurs] : no murmurs [Bowel Sounds] : normal bowel sounds [Edema] : there was no peripheral edema [Abdomen Soft] : soft [Abdomen Tenderness] : non-tender [Cervical Lymph Nodes Enlarged Posterior Bilaterally] : posterior cervical [Cervical Lymph Nodes Enlarged Anterior Bilaterally] : anterior cervical [Supraclavicular Lymph Nodes Enlarged Bilaterally] : supraclavicular [No CVA Tenderness] : no ~M costovertebral angle tenderness [No Spinal Tenderness] : no spinal tenderness [Abnormal Walk] : normal gait [Musculoskeletal - Swelling] : no joint swelling seen [] : no rash [No Focal Deficits] : no focal deficits [Affect] : the affect was normal [Oriented To Time, Place, And Person] : oriented to person, place, and time [Mood] : the mood was normal [FreeTextEntry1] : scattered freckles

## 2020-05-19 ENCOUNTER — APPOINTMENT (OUTPATIENT)
Dept: INTERNAL MEDICINE | Facility: CLINIC | Age: 35
End: 2020-05-19

## 2020-08-19 ENCOUNTER — EMERGENCY (EMERGENCY)
Facility: HOSPITAL | Age: 35
LOS: 1 days | Discharge: HOME | End: 2020-08-19
Attending: EMERGENCY MEDICINE | Admitting: EMERGENCY MEDICINE
Payer: MEDICAID

## 2020-08-19 VITALS
SYSTOLIC BLOOD PRESSURE: 111 MMHG | HEART RATE: 64 BPM | TEMPERATURE: 98 F | DIASTOLIC BLOOD PRESSURE: 59 MMHG | RESPIRATION RATE: 16 BRPM | OXYGEN SATURATION: 100 %

## 2020-08-19 VITALS
TEMPERATURE: 99 F | SYSTOLIC BLOOD PRESSURE: 100 MMHG | DIASTOLIC BLOOD PRESSURE: 64 MMHG | WEIGHT: 145.06 LBS | OXYGEN SATURATION: 100 % | HEIGHT: 61 IN | HEART RATE: 74 BPM | RESPIRATION RATE: 16 BRPM

## 2020-08-19 DIAGNOSIS — Z98.89 OTHER SPECIFIED POSTPROCEDURAL STATES: Chronic | ICD-10-CM

## 2020-08-19 LAB
ALBUMIN SERPL ELPH-MCNC: 4.4 G/DL — SIGNIFICANT CHANGE UP (ref 3.3–5)
ALP SERPL-CCNC: 63 U/L — SIGNIFICANT CHANGE UP (ref 40–120)
ALT FLD-CCNC: 15 U/L — SIGNIFICANT CHANGE UP (ref 4–33)
ANION GAP SERPL CALC-SCNC: 12 MMO/L — SIGNIFICANT CHANGE UP (ref 7–14)
AST SERPL-CCNC: 18 U/L — SIGNIFICANT CHANGE UP (ref 4–32)
BASOPHILS # BLD AUTO: 0.06 K/UL — SIGNIFICANT CHANGE UP (ref 0–0.2)
BASOPHILS NFR BLD AUTO: 0.6 % — SIGNIFICANT CHANGE UP (ref 0–2)
BILIRUB SERPL-MCNC: 0.3 MG/DL — SIGNIFICANT CHANGE UP (ref 0.2–1.2)
BUN SERPL-MCNC: 12 MG/DL — SIGNIFICANT CHANGE UP (ref 7–23)
CALCIUM SERPL-MCNC: 9.4 MG/DL — SIGNIFICANT CHANGE UP (ref 8.4–10.5)
CHLORIDE SERPL-SCNC: 104 MMOL/L — SIGNIFICANT CHANGE UP (ref 98–107)
CO2 SERPL-SCNC: 24 MMOL/L — SIGNIFICANT CHANGE UP (ref 22–31)
CREAT SERPL-MCNC: 0.61 MG/DL — SIGNIFICANT CHANGE UP (ref 0.5–1.3)
D DIMER BLD IA.RAPID-MCNC: < 150 NG/ML — SIGNIFICANT CHANGE UP
EOSINOPHIL # BLD AUTO: 0.51 K/UL — HIGH (ref 0–0.5)
EOSINOPHIL NFR BLD AUTO: 4.8 % — SIGNIFICANT CHANGE UP (ref 0–6)
GLUCOSE SERPL-MCNC: 87 MG/DL — SIGNIFICANT CHANGE UP (ref 70–99)
HCT VFR BLD CALC: 34.5 % — SIGNIFICANT CHANGE UP (ref 34.5–45)
HGB BLD-MCNC: 10.9 G/DL — LOW (ref 11.5–15.5)
IMM GRANULOCYTES NFR BLD AUTO: 0.3 % — SIGNIFICANT CHANGE UP (ref 0–1.5)
LYMPHOCYTES # BLD AUTO: 3.5 K/UL — HIGH (ref 1–3.3)
LYMPHOCYTES # BLD AUTO: 32.9 % — SIGNIFICANT CHANGE UP (ref 13–44)
MCHC RBC-ENTMCNC: 25.8 PG — LOW (ref 27–34)
MCHC RBC-ENTMCNC: 31.6 % — LOW (ref 32–36)
MCV RBC AUTO: 81.6 FL — SIGNIFICANT CHANGE UP (ref 80–100)
MONOCYTES # BLD AUTO: 0.53 K/UL — SIGNIFICANT CHANGE UP (ref 0–0.9)
MONOCYTES NFR BLD AUTO: 5 % — SIGNIFICANT CHANGE UP (ref 2–14)
NEUTROPHILS # BLD AUTO: 6 K/UL — SIGNIFICANT CHANGE UP (ref 1.8–7.4)
NEUTROPHILS NFR BLD AUTO: 56.4 % — SIGNIFICANT CHANGE UP (ref 43–77)
NRBC # FLD: 0 K/UL — SIGNIFICANT CHANGE UP (ref 0–0)
PLATELET # BLD AUTO: 336 K/UL — SIGNIFICANT CHANGE UP (ref 150–400)
PMV BLD: 9.6 FL — SIGNIFICANT CHANGE UP (ref 7–13)
POTASSIUM SERPL-MCNC: 4 MMOL/L — SIGNIFICANT CHANGE UP (ref 3.5–5.3)
POTASSIUM SERPL-SCNC: 4 MMOL/L — SIGNIFICANT CHANGE UP (ref 3.5–5.3)
PROT SERPL-MCNC: 7.6 G/DL — SIGNIFICANT CHANGE UP (ref 6–8.3)
RBC # BLD: 4.23 M/UL — SIGNIFICANT CHANGE UP (ref 3.8–5.2)
RBC # FLD: 13.2 % — SIGNIFICANT CHANGE UP (ref 10.3–14.5)
SODIUM SERPL-SCNC: 140 MMOL/L — SIGNIFICANT CHANGE UP (ref 135–145)
TROPONIN T, HIGH SENSITIVITY: < 6 NG/L — SIGNIFICANT CHANGE UP (ref ?–14)
WBC # BLD: 10.63 K/UL — HIGH (ref 3.8–10.5)
WBC # FLD AUTO: 10.63 K/UL — HIGH (ref 3.8–10.5)

## 2020-08-19 PROCEDURE — 93010 ELECTROCARDIOGRAM REPORT: CPT

## 2020-08-19 PROCEDURE — 71046 X-RAY EXAM CHEST 2 VIEWS: CPT | Mod: 26

## 2020-08-19 PROCEDURE — 99284 EMERGENCY DEPT VISIT MOD MDM: CPT | Mod: 25

## 2020-08-19 NOTE — ED PROVIDER NOTE - PATIENT PORTAL LINK FT
You can access the FollowMyHealth Patient Portal offered by [facility] by registering at the following website: [email or web address]. By joining [facility]’s FollowMyHealth portal, you will also be able to view your health information using other applications (apps) compatible with our system.

## 2020-08-19 NOTE — ED PROVIDER NOTE - OBJECTIVE STATEMENT
35 y/o female with no significant PMHx presents to the ER c/o 1 week of intermittent shortness of breath, midsternal chest pain and left arm pain.  Pt states chest pain is worse with deep inspiration.  Pt denies fevers, chills, cough, OCP use, calf pain, leg swelling, hx of COVID, recent illness.

## 2020-08-19 NOTE — ED PROVIDER NOTE - PROGRESS NOTE DETAILS
APOLINAR Mccray: pt feels better ambulating without difficulty.  Results reviewed with patient.  Discharge reviewed and discussed with patient.

## 2020-08-19 NOTE — ED PROVIDER NOTE - ATTENDING CONTRIBUTION TO CARE
DR. HDEZ, ATTENDING MD-  I performed a face to face bedside interview with the patient regarding history of present illness, review of symptoms and past medical history. I completed an independent physical exam.  I have discussed the patient's plan of care with the PA.   Documentation as above in the note.    33 y/o female with pleuritic cp lue pain sob x1 wk.  Well appearing.  EKG wnl, nsr, no st elevations or depressions, no t wave inversions, no prolonged qtc, no heart block, no delta or epsilon waves, no lvh.  Evaluate for acs, pe, ptx.  Obtain ucg cbc cmp trop cxr antic dc with pcp f/u as o/p.

## 2020-08-19 NOTE — ED PROVIDER NOTE - CLINICAL SUMMARY MEDICAL DECISION MAKING FREE TEXT BOX
35 y/o female with no significant PMHx presents to the ER c/o 1 week of intermittent shortness of breath, midsternal chest pain and left arm pain.   Pt is well appearing, NAD, EKG NSR, will check labs, CXR, reassess. Follow up PMD/Cardiology.

## 2020-08-19 NOTE — ED ADULT NURSE NOTE - OBJECTIVE STATEMENT
34 yr old female pt presents to ED intake for evaluation of left sided chest pain sob and left arm pain. pt axox4, nad, ambulates with steady gait, states that her pain started last Sunday and has been intermittent since. Pt describes pain as sharp non radiating to left chest wall, left shoulder pain radiating down left arm, and pain upon deep inspiration. denies any cough fever chills or sick contacts.

## 2020-09-14 ENCOUNTER — APPOINTMENT (OUTPATIENT)
Dept: INTERNAL MEDICINE | Facility: CLINIC | Age: 35
End: 2020-09-14
Payer: COMMERCIAL

## 2020-09-14 VITALS
DIASTOLIC BLOOD PRESSURE: 68 MMHG | OXYGEN SATURATION: 98 % | RESPIRATION RATE: 14 BRPM | TEMPERATURE: 98.4 F | HEART RATE: 64 BPM | WEIGHT: 153 LBS | HEIGHT: 61 IN | BODY MASS INDEX: 28.89 KG/M2 | SYSTOLIC BLOOD PRESSURE: 100 MMHG

## 2020-09-14 DIAGNOSIS — Z30.9 ENCOUNTER FOR CONTRACEPTIVE MANAGEMENT, UNSPECIFIED: ICD-10-CM

## 2020-09-14 DIAGNOSIS — L29.0 PRURITUS ANI: ICD-10-CM

## 2020-09-14 DIAGNOSIS — Z87.76 PERSONAL HISTORY OF (CORRECTED) CONGENITAL MALFORMATIONS OF INTEGUMENT, LIMBS AND MUSCULOSKELETAL SYSTEM: ICD-10-CM

## 2020-09-14 DIAGNOSIS — M54.2 CERVICALGIA: ICD-10-CM

## 2020-09-14 PROCEDURE — 99213 OFFICE O/P EST LOW 20 MIN: CPT | Mod: 25

## 2020-09-14 PROCEDURE — G0444 DEPRESSION SCREEN ANNUAL: CPT

## 2020-09-14 PROCEDURE — 36415 COLL VENOUS BLD VENIPUNCTURE: CPT

## 2020-09-14 PROCEDURE — 99395 PREV VISIT EST AGE 18-39: CPT | Mod: 25

## 2020-09-14 NOTE — PAST MEDICAL HISTORY
[Menarche Age ____] : age at menarche was [unfilled] [Normal Duration] : the duration was normal [Regular Cycle Intervals] : have been regular [Total Preg ___] : G: [unfilled] [Definite ___ (Date)] : the last menstrual period was [unfilled] [Living ___] : Living: [unfilled] [Dysmenorrhea] : dysmenorrhea

## 2020-09-15 PROBLEM — Z30.9 CONTRACEPTION MANAGEMENT: Status: RESOLVED | Noted: 2018-11-16 | Resolved: 2020-09-15

## 2020-09-15 PROBLEM — L29.0 RECTAL ITCHING: Status: RESOLVED | Noted: 2019-11-06 | Resolved: 2020-09-15

## 2020-09-15 PROBLEM — Z87.76 HISTORY OF CHEST DEFORMITY: Status: RESOLVED | Noted: 2019-11-06 | Resolved: 2020-09-15

## 2020-09-15 NOTE — HEALTH RISK ASSESSMENT
[0] : 2) Feeling down, depressed, or hopeless: Not at all (0) [Smoke Detector] : smoke detector [Patient reported PAP Smear was normal] : Patient reported PAP Smear was normal [Carbon Monoxide Detector] : carbon monoxide detector [Seat Belt] :  uses seat belt [Sunscreen] : uses sunscreen [HIV test declined] : HIV test declined [[id number]] : 0 [Guns at Home] : no guns at home [PapSmearDate] : 03/19 [HIVDate] : 11/19 [HIVComments] : negative [HepatitisCDate] : 11/19 [HepatitisCComments] : negative

## 2020-09-15 NOTE — DATA REVIEWED
[FreeTextEntry1] : 2/18/20 labs reviewed\par \par HIE records:\par  8/19/20\par \par cbc wnl, except wbc 10.63 Hg 10.9\par cmp wnl\par Trop < 6\par d-dimer < 150, negative\par \par cxr: clear lungs, unremarkable osseous structures\par
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
## 2020-09-15 NOTE — REVIEW OF SYSTEMS
[see HPI] : see HPI [Negative] : Gastrointestinal [Wheezing] : no wheezing [Cough] : no cough [Dizziness] : no dizziness [Dysuria] : no dysuria [Vaginal Discharge] : no vaginal discharge [Fainting] : no fainting [Limb Weakness] : no limb weakness [Difficulty Walking] : no difficulty walking

## 2020-09-15 NOTE — PHYSICAL EXAM
[General Appearance - In No Acute Distress] : in no acute distress [General Appearance - Alert] : alert [General Appearance - Well-Appearing] : healthy appearing [PERRL With Normal Accommodation] : pupils were equal in size, round, and reactive to light [Sclera] : the sclera and conjunctiva were normal [Extraocular Movements] : extraocular movements were intact [Nasal Cavity] : the nasal mucosa and septum were normal [Outer Ear] : the ears and nose were normal in appearance [Oropharynx] : the oropharynx was normal [Neck Appearance] : the appearance of the neck was normal [Thyroid Diffuse Enlargement] : the thyroid was not enlarged [Respiration, Rhythm And Depth] : normal respiratory rhythm and effort [Auscultation Breath Sounds / Voice Sounds] : lungs were clear to auscultation bilaterally [Heart Rate And Rhythm] : heart rate was normal and rhythm regular [Heart Sounds] : normal S1 and S2 [Murmurs] : no murmurs [Full Pulse] : the pedal pulses are present [Bowel Sounds] : normal bowel sounds [Abdomen Soft] : soft [Abdomen Tenderness] : non-tender [Supraclavicular Lymph Nodes Enlarged Bilaterally] : supraclavicular [Cervical Lymph Nodes Enlarged Posterior Bilaterally] : posterior cervical [Cervical Lymph Nodes Enlarged Anterior Bilaterally] : anterior cervical [No Spinal Tenderness] : no spinal tenderness [No CVA Tenderness] : no ~M costovertebral angle tenderness [Musculoskeletal - Swelling] : no joint swelling seen [Abnormal Walk] : normal gait [No Focal Deficits] : no focal deficits [Oriented To Time, Place, And Person] : oriented to person, place, and time [Affect] : the affect was normal [Mood] : the mood was normal [Both Tympanic Membranes Were Examined] : both tympanic membranes were normal [Thyroid Nodule] : there were no palpable thyroid nodules [] : no hepato-splenomegaly [FreeTextEntry1] : scattered freckles; groin: b/l mild hyperpigmented rash, no vaginal d/c
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
## 2020-09-17 ENCOUNTER — APPOINTMENT (OUTPATIENT)
Dept: ORTHOPEDIC SURGERY | Facility: CLINIC | Age: 35
End: 2020-09-17
Payer: COMMERCIAL

## 2020-09-17 VITALS
HEIGHT: 61 IN | HEART RATE: 78 BPM | SYSTOLIC BLOOD PRESSURE: 112 MMHG | DIASTOLIC BLOOD PRESSURE: 76 MMHG | BODY MASS INDEX: 28.32 KG/M2 | WEIGHT: 150 LBS

## 2020-09-17 PROCEDURE — 99204 OFFICE O/P NEW MOD 45 MIN: CPT

## 2020-09-17 PROCEDURE — 73590 X-RAY EXAM OF LOWER LEG: CPT | Mod: RT

## 2020-09-17 NOTE — PHYSICAL EXAM
[LE] : Sensory: Intact in bilateral lower extremities [Knee] : patellar 2+ and symmetric bilaterally [Ankle] : ankle 2+ and symmetric bilaterally [DP] : dorsalis pedis 2+ and symmetric bilaterally [PT] : posterior tibial 2+ and symmetric bilaterally [Normal] : Alert and in no acute distress [Poor Appearance] : well-appearing [Acute Distress] : not in acute distress [Obese] : not obese [de-identified] : The patient has no respiratory distress. Mood and affect are normal. The patient is alert and oriented to person, place and time.\par The patient has no pain with motion of the hips.  There is no tenderness of either hip.  Examination of the knees demonstrates no swelling or deformity.  There is no tenderness.  There is no instability of collateral or cruciate ligaments.  Quadriceps and hamstring function are intact bilaterally.  There is no tenderness, swelling or deformity of either leg.  Both Achilles tendons are intact.  Both calves are soft and nontender.  There is no tenderness of either ankle or foot.  She has ankle dorsiflexion of 15 degrees and plantar flexion of 40 degrees bilaterally.  The skin is intact.  There is no lymphedema. [de-identified] : AP and lateral x-rays of the right leg demonstrate no fracture, no dislocation and no bony abnormality.

## 2020-09-17 NOTE — CONSULT LETTER
[Dear  ___] : Dear  [unfilled], [Consult Letter:] : I had the pleasure of evaluating your patient, [unfilled]. [Please see my note below.] : Please see my note below. [Consult Closing:] : Thank you very much for allowing me to participate in the care of this patient.  If you have any questions, please do not hesitate to contact me. [Sincerely,] : Sincerely, [FreeTextEntry2] : Ana Calderon MD [FreeTextEntry3] : Jordan Boo MD

## 2020-09-17 NOTE — DISCUSSION/SUMMARY
[de-identified] : The patient has a history of swelling and heaviness of the right leg.  She has a normal examination today and normal x-rays.  I find no evidence of orthopedic problem.  She will continue evaluation with her primary care physician.  She will return as needed.

## 2020-09-17 NOTE — HISTORY OF PRESENT ILLNESS
[de-identified] : 34 year old female presents for evaluation of her right leg. A couple weeks ago she noticed swelling of both lower legs which worsens towards the end of the day. Recently she has been having associated discomfort and tightness in the right leg and presents for evaluation. There was no injury or trauma. She was referred by her PCP for an ultrasound but has not yet gone for the study.

## 2020-09-18 ENCOUNTER — APPOINTMENT (OUTPATIENT)
Dept: ORTHOPEDIC SURGERY | Facility: CLINIC | Age: 35
End: 2020-09-18
Payer: COMMERCIAL

## 2020-09-18 VITALS
WEIGHT: 150 LBS | SYSTOLIC BLOOD PRESSURE: 113 MMHG | HEART RATE: 81 BPM | BODY MASS INDEX: 28.32 KG/M2 | DIASTOLIC BLOOD PRESSURE: 76 MMHG | HEIGHT: 61 IN

## 2020-09-18 VITALS — TEMPERATURE: 98 F

## 2020-09-18 DIAGNOSIS — Z87.09 PERSONAL HISTORY OF OTHER DISEASES OF THE RESPIRATORY SYSTEM: ICD-10-CM

## 2020-09-18 LAB
25(OH)D3 SERPL-MCNC: 23.3 NG/ML
ALBUMIN SERPL ELPH-MCNC: 4.7 G/DL
ALP BLD-CCNC: 77 U/L
ALT SERPL-CCNC: 27 U/L
ANION GAP SERPL CALC-SCNC: 14 MMOL/L
AST SERPL-CCNC: 24 U/L
BASOPHILS # BLD AUTO: 0.04 K/UL
BASOPHILS NFR BLD AUTO: 0.4 %
BILIRUB SERPL-MCNC: 0.4 MG/DL
BUN SERPL-MCNC: 12 MG/DL
CALCIUM SERPL-MCNC: 9.4 MG/DL
CHLORIDE SERPL-SCNC: 101 MMOL/L
CHOLEST SERPL-MCNC: 196 MG/DL
CHOLEST/HDLC SERPL: 3 RATIO
CO2 SERPL-SCNC: 22 MMOL/L
CREAT SERPL-MCNC: 0.56 MG/DL
EOSINOPHIL # BLD AUTO: 0.44 K/UL
EOSINOPHIL NFR BLD AUTO: 4.9 %
ESTIMATED AVERAGE GLUCOSE: 114 MG/DL
FERRITIN SERPL-MCNC: 11 NG/ML
FOLATE SERPL-MCNC: 11.2 NG/ML
GLUCOSE SERPL-MCNC: 58 MG/DL
HBA1C MFR BLD HPLC: 5.6 %
HCT VFR BLD CALC: 36.2 %
HDLC SERPL-MCNC: 66 MG/DL
HGB BLD-MCNC: 10.7 G/DL
IMM GRANULOCYTES NFR BLD AUTO: 0.2 %
IRON SATN MFR SERPL: 8 %
IRON SERPL-MCNC: 36 UG/DL
LDLC SERPL CALC-MCNC: 116 MG/DL
LYMPHOCYTES # BLD AUTO: 3.42 K/UL
LYMPHOCYTES NFR BLD AUTO: 38.1 %
MAN DIFF?: NORMAL
MCHC RBC-ENTMCNC: 25.2 PG
MCHC RBC-ENTMCNC: 29.6 GM/DL
MCV RBC AUTO: 85.4 FL
MONOCYTES # BLD AUTO: 0.39 K/UL
MONOCYTES NFR BLD AUTO: 4.3 %
NEUTROPHILS # BLD AUTO: 4.67 K/UL
NEUTROPHILS NFR BLD AUTO: 52.1 %
PLATELET # BLD AUTO: 359 K/UL
POTASSIUM SERPL-SCNC: 4.7 MMOL/L
PROT SERPL-MCNC: 7.2 G/DL
RBC # BLD: 4.24 M/UL
RBC # FLD: 13.8 %
SARS-COV-2 IGG SERPL IA-ACNC: <0.1 INDEX
SARS-COV-2 IGG SERPL QL IA: NEGATIVE
SODIUM SERPL-SCNC: 137 MMOL/L
TIBC SERPL-MCNC: 435 UG/DL
TRIGL SERPL-MCNC: 68 MG/DL
TSH SERPL-ACNC: 1.34 UIU/ML
UIBC SERPL-MCNC: 399 UG/DL
VIT B12 SERPL-MCNC: 695 PG/ML
WBC # FLD AUTO: 8.98 K/UL

## 2020-09-18 PROCEDURE — 99214 OFFICE O/P EST MOD 30 MIN: CPT

## 2020-09-18 PROCEDURE — 72110 X-RAY EXAM L-2 SPINE 4/>VWS: CPT

## 2020-09-18 PROCEDURE — 72050 X-RAY EXAM NECK SPINE 4/5VWS: CPT

## 2020-09-18 NOTE — PHYSICAL EXAM
[de-identified] : Cervical Physical Exam\par \par Gait -normal\par \par Station -normal\par \par Sagittal balance -normal\par \par Compensatory mechanism? -None\par \par Horizontal gaze -maintained\par \par Heel walk -normal\par \par Toe walk -normal\par \par Reflexes\par Biceps -normal\par Triceps -normal\par Brachioradialis -normal\par Patellar -normal\par Gastroc -normal\par Clonus -no \par \par Ortiz´s -none\par \par Shoulder Exam -normal\par \par Spurling´s -positive on left and right\par \par Wrist Pulses -2+ radial/ulnar\par \par Foot Pulses -2+ DP/PT\par \par Cervical range of motion -globally reduced\par \par Sensation \par C5-T1 sensation intact to light touch bilaterally\par \par L1-S1 sensation intact to light touch bilaterally\par \par Motor\par \par \par 	Deltoid	Biceps	Triceps	WF	WE	IO	\par Right	5/5	5/5	5/5	5/5	5/5	5/5	5/5\par Left	5/5	4/5	4/5	4/5	4/5	5/5	5/5\par \par \par 	IP	Quad	HS	TA	Gastroc	EHL\par Right	5/5	5/5	5/5	5/5	5/5	5/5\par Left	5/5	5/5	5/5	5/5	5/5	5/5\par \par \par  [de-identified] : Cervical radiographs\par Disc degeneration noted which is more than what I would expect for patient her age\par Straightening of cervical lordosis\par Minimal to no facet arthropathy\par \par Lumbar radiographs\par Lumbar lordosis maintained\par No significant disc degeneration\par No significant facet arthropathy

## 2020-09-18 NOTE — HISTORY OF PRESENT ILLNESS
[de-identified] : This is a 34-year-old female that is been dealing with 1 year of worsening neck and left arm pain.  She states that her left arm feels heavy at times.  It feels better when she has her left arm above her shoulder.  She does complain of some intermittent right shoulder/lateral arm pain.  She states that is 50-50 in terms of pain related to her neck and arm.  To date she has tried activity modifications.  She is very hesitant about Tylenol/ibuprofen and says that she cannot really tolerate these medications.\par \par Patient denies any issues with balance.  She denies any issues with hand dexterity including no issues with buttoning her shirts or dropping objects.

## 2020-09-18 NOTE — ASSESSMENT
[FreeTextEntry1] : This is a 34-year-old female that is been dealing with 1 year of worsening neck pain and left arm heaviness.  She is also developed weakness over her left arm.  This is a diffuse weakness involving her wrist flexion, wrist extension, biceps and triceps.  She initiated conservative management around September 2019 according to the patient.  Given her significant symptoms I would like to proceed with a cervical MRI.  An order for this has been placed today.  I would also like her to be involved with goal-directed physical therapy.  I will have her follow-up in 2 to 3 weeks to discuss her imaging findings and discuss next steps in treatment.

## 2020-09-21 ENCOUNTER — OUTPATIENT (OUTPATIENT)
Dept: OUTPATIENT SERVICES | Facility: HOSPITAL | Age: 35
LOS: 1 days | End: 2020-09-21
Payer: COMMERCIAL

## 2020-09-21 ENCOUNTER — RESULT REVIEW (OUTPATIENT)
Age: 35
End: 2020-09-21

## 2020-09-21 ENCOUNTER — APPOINTMENT (OUTPATIENT)
Dept: ULTRASOUND IMAGING | Facility: CLINIC | Age: 35
End: 2020-09-21

## 2020-09-21 DIAGNOSIS — Z98.89 OTHER SPECIFIED POSTPROCEDURAL STATES: Chronic | ICD-10-CM

## 2020-09-21 DIAGNOSIS — Z00.8 ENCOUNTER FOR OTHER GENERAL EXAMINATION: ICD-10-CM

## 2020-09-21 PROCEDURE — 93970 EXTREMITY STUDY: CPT | Mod: 26

## 2020-09-21 PROCEDURE — 93970 EXTREMITY STUDY: CPT

## 2020-10-05 ENCOUNTER — APPOINTMENT (OUTPATIENT)
Dept: MRI IMAGING | Facility: CLINIC | Age: 35
End: 2020-10-05

## 2020-11-04 ENCOUNTER — APPOINTMENT (OUTPATIENT)
Dept: INTERNAL MEDICINE | Facility: CLINIC | Age: 35
End: 2020-11-04
Payer: COMMERCIAL

## 2020-11-04 VITALS
WEIGHT: 151 LBS | TEMPERATURE: 98 F | BODY MASS INDEX: 28.51 KG/M2 | RESPIRATION RATE: 14 BRPM | OXYGEN SATURATION: 98 % | DIASTOLIC BLOOD PRESSURE: 70 MMHG | HEART RATE: 73 BPM | SYSTOLIC BLOOD PRESSURE: 100 MMHG | HEIGHT: 61 IN

## 2020-11-04 DIAGNOSIS — Z11.59 ENCOUNTER FOR SCREENING FOR OTHER VIRAL DISEASES: ICD-10-CM

## 2020-11-04 PROCEDURE — 99072 ADDL SUPL MATRL&STAF TM PHE: CPT

## 2020-11-04 PROCEDURE — 36415 COLL VENOUS BLD VENIPUNCTURE: CPT

## 2020-11-04 PROCEDURE — 99214 OFFICE O/P EST MOD 30 MIN: CPT | Mod: 25

## 2020-11-05 LAB
BASOPHILS # BLD AUTO: 0.05 K/UL
BASOPHILS NFR BLD AUTO: 0.6 %
EOSINOPHIL # BLD AUTO: 0.51 K/UL
EOSINOPHIL NFR BLD AUTO: 6 %
FERRITIN SERPL-MCNC: 15 NG/ML
HCT VFR BLD CALC: 35.6 %
HGB BLD-MCNC: 11.1 G/DL
IMM GRANULOCYTES NFR BLD AUTO: 0.1 %
IRON SATN MFR SERPL: 11 %
IRON SERPL-MCNC: 42 UG/DL
LYMPHOCYTES # BLD AUTO: 3.27 K/UL
LYMPHOCYTES NFR BLD AUTO: 38.3 %
MAN DIFF?: NORMAL
MCHC RBC-ENTMCNC: 25.7 PG
MCHC RBC-ENTMCNC: 31.2 GM/DL
MCV RBC AUTO: 82.4 FL
MONOCYTES # BLD AUTO: 0.49 K/UL
MONOCYTES NFR BLD AUTO: 5.7 %
NEUTROPHILS # BLD AUTO: 4.2 K/UL
NEUTROPHILS NFR BLD AUTO: 49.3 %
PLATELET # BLD AUTO: 365 K/UL
RBC # BLD: 4.32 M/UL
RBC # FLD: 14.5 %
TIBC SERPL-MCNC: 365 UG/DL
UIBC SERPL-MCNC: 323 UG/DL
WBC # FLD AUTO: 8.53 K/UL

## 2020-11-06 ENCOUNTER — RESULT REVIEW (OUTPATIENT)
Age: 35
End: 2020-11-06

## 2020-11-06 ENCOUNTER — OUTPATIENT (OUTPATIENT)
Dept: OUTPATIENT SERVICES | Facility: HOSPITAL | Age: 35
LOS: 1 days | End: 2020-11-06
Payer: MEDICAID

## 2020-11-06 ENCOUNTER — APPOINTMENT (OUTPATIENT)
Dept: MRI IMAGING | Facility: CLINIC | Age: 35
End: 2020-11-06
Payer: COMMERCIAL

## 2020-11-06 DIAGNOSIS — Z00.8 ENCOUNTER FOR OTHER GENERAL EXAMINATION: ICD-10-CM

## 2020-11-06 DIAGNOSIS — Z98.89 OTHER SPECIFIED POSTPROCEDURAL STATES: Chronic | ICD-10-CM

## 2020-11-06 PROCEDURE — 72141 MRI NECK SPINE W/O DYE: CPT

## 2020-11-06 PROCEDURE — 72141 MRI NECK SPINE W/O DYE: CPT | Mod: 26
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
## 2020-11-06 NOTE — PHYSICAL EXAM
[General Appearance - Alert] : alert [General Appearance - In No Acute Distress] : in no acute distress [General Appearance - Well-Appearing] : healthy appearing [Sclera] : the sclera and conjunctiva were normal [PERRL With Normal Accommodation] : pupils were equal in size, round, and reactive to light [Extraocular Movements] : extraocular movements were intact [Outer Ear] : the ears and nose were normal in appearance [Nasal Cavity] : the nasal mucosa and septum were normal [Oropharynx] : the oropharynx was normal [Neck Appearance] : the appearance of the neck was normal [Thyroid Diffuse Enlargement] : the thyroid was not enlarged [Respiration, Rhythm And Depth] : normal respiratory rhythm and effort [Auscultation Breath Sounds / Voice Sounds] : lungs were clear to auscultation bilaterally [Heart Rate And Rhythm] : heart rate was normal and rhythm regular [Heart Sounds] : normal S1 and S2 [Murmurs] : no murmurs [Full Pulse] : the pedal pulses are present [Bowel Sounds] : normal bowel sounds [Abdomen Soft] : soft [Abdomen Tenderness] : non-tender [Cervical Lymph Nodes Enlarged Posterior Bilaterally] : posterior cervical [Cervical Lymph Nodes Enlarged Anterior Bilaterally] : anterior cervical [Supraclavicular Lymph Nodes Enlarged Bilaterally] : supraclavicular [No CVA Tenderness] : no ~M costovertebral angle tenderness [No Spinal Tenderness] : no spinal tenderness [Abnormal Walk] : normal gait [Musculoskeletal - Swelling] : no joint swelling seen [] : no rash [No Focal Deficits] : no focal deficits [Oriented To Time, Place, And Person] : oriented to person, place, and time [Affect] : the affect was normal [Mood] : the mood was normal [FreeTextEntry1] : scattered freckles; groin: b/l mild hyperpigmented rash, no vaginal d/c
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
## 2020-11-06 NOTE — REVIEW OF SYSTEMS
[see HPI] : see HPI [Negative] : Psychiatric [Wheezing] : no wheezing [Cough] : no cough [Dysuria] : no dysuria [Vaginal Discharge] : no vaginal discharge [Dizziness] : no dizziness [Fainting] : no fainting [Limb Weakness] : no limb weakness [Difficulty Walking] : no difficulty walking

## 2020-11-20 ENCOUNTER — APPOINTMENT (OUTPATIENT)
Dept: ORTHOPEDIC SURGERY | Facility: CLINIC | Age: 35
End: 2020-11-20
Payer: COMMERCIAL

## 2020-11-20 PROCEDURE — 99214 OFFICE O/P EST MOD 30 MIN: CPT

## 2020-11-20 NOTE — HISTORY OF PRESENT ILLNESS
[de-identified] : This is a 34-year-old female here today for reevaluation of her neck pain and right arm symptoms.  She states since her last visit she does have improvement in her symptoms.  Unfortunately this right-sided arm pain is still present.  She has been doing physical therapy and her home exercises diligently.  They have helped somewhat.  She is here today to go over her MRI findings and any other possible treatment options.

## 2020-11-20 NOTE — PHYSICAL EXAM
[de-identified] : Cervical Physical Exam\par \par Gait -normal\par \par Station -normal\par \par Sagittal balance -normal\par \par Compensatory mechanism? -None\par \par Horizontal gaze -maintained\par \par Heel walk -normal\par \par Toe walk -normal\par \par Reflexes\par Biceps -normal\par Triceps -normal\par Brachioradialis -normal\par Patellar -normal\par Gastroc -normal\par Clonus -no \par \par Ortiz´s -none\par \par Shoulder Exam -normal\par \par Spurling´s -positive on left and right\par \par Wrist Pulses -2+ radial/ulnar\par \par Foot Pulses -2+ DP/PT\par \par Cervical range of motion -globally reduced\par \par Sensation \par C5-T1 sensation intact to light touch bilaterally\par \par L1-S1 sensation intact to light touch bilaterally\par \par Motor\par \par \par 	Deltoid	Biceps	Triceps	WF	WE	IO	\par Right	5/5	5/5	5/5	5/5	5/5	5/5	5/5\par Left	5/5	5/5	5/5	4/5	4/5	5/5	5/5\par \par \par 	IP	Quad	HS	TA	Gastroc	EHL\par Right	5/5	5/5	5/5	5/5	5/5	5/5\par Left	5/5	5/5	5/5	5/5	5/5	5/5\par \par \par The patient continues to have weakness in her right arm but this does seem to be somewhat improved as compared to her previous examination. [de-identified] : Cervical MRI\par Mild disc bulges noted at C4-C5 C5-C6\par Otherwise benign MRI\par No areas of severe stenosis

## 2020-11-20 NOTE — ASSESSMENT
[FreeTextEntry1] : This is a 34-year-old female here today for reevaluation of her neck and right arm pain.  Overall she is doing better.  She is somewhat frustrated that it has not completely resolved but after going through her MRI findings she agrees that she still wants to pursue conservative management.  I think she will continue to improve and I want to see her back in 3 months for repeat clinical evaluation.  She knows to call back sooner if any of her symptoms worsen or change in any way.

## 2021-02-03 ENCOUNTER — APPOINTMENT (OUTPATIENT)
Dept: INTERNAL MEDICINE | Facility: CLINIC | Age: 36
End: 2021-02-03
Payer: COMMERCIAL

## 2021-02-03 VITALS
WEIGHT: 157 LBS | TEMPERATURE: 97.2 F | BODY MASS INDEX: 29.67 KG/M2 | HEART RATE: 82 BPM | SYSTOLIC BLOOD PRESSURE: 100 MMHG | OXYGEN SATURATION: 98 % | DIASTOLIC BLOOD PRESSURE: 68 MMHG | RESPIRATION RATE: 14 BRPM

## 2021-02-03 DIAGNOSIS — R06.00 DYSPNEA, UNSPECIFIED: ICD-10-CM

## 2021-02-03 DIAGNOSIS — M79.602 PAIN IN LEFT ARM: ICD-10-CM

## 2021-02-03 DIAGNOSIS — L30.4 ERYTHEMA INTERTRIGO: ICD-10-CM

## 2021-02-03 DIAGNOSIS — M54.12 RADICULOPATHY, CERVICAL REGION: ICD-10-CM

## 2021-02-03 DIAGNOSIS — M25.661 STIFFNESS OF RIGHT KNEE, NOT ELSEWHERE CLASSIFIED: ICD-10-CM

## 2021-02-03 PROCEDURE — 99072 ADDL SUPL MATRL&STAF TM PHE: CPT

## 2021-02-03 PROCEDURE — 99214 OFFICE O/P EST MOD 30 MIN: CPT | Mod: 25

## 2021-02-03 RX ORDER — MINOXIDIL 5 G/100G
AEROSOL, FOAM TOPICAL
Refills: 0 | Status: DISCONTINUED | COMMUNITY
End: 2021-02-03

## 2021-02-03 NOTE — PHYSICAL EXAM
[General Appearance - Alert] : alert [General Appearance - In No Acute Distress] : in no acute distress [General Appearance - Well-Appearing] : healthy appearing [Sclera] : the sclera and conjunctiva were normal [PERRL With Normal Accommodation] : pupils were equal in size, round, and reactive to light [Extraocular Movements] : extraocular movements were intact [Outer Ear] : the ears and nose were normal in appearance [Nasal Cavity] : the nasal mucosa and septum were normal [Oropharynx] : the oropharynx was normal [Neck Appearance] : the appearance of the neck was normal [Thyroid Diffuse Enlargement] : the thyroid was not enlarged [Respiration, Rhythm And Depth] : normal respiratory rhythm and effort [Auscultation Breath Sounds / Voice Sounds] : lungs were clear to auscultation bilaterally [Heart Rate And Rhythm] : heart rate was normal and rhythm regular [Heart Sounds] : normal S1 and S2 [Murmurs] : no murmurs [Full Pulse] : the pedal pulses are present [Bowel Sounds] : normal bowel sounds [Abdomen Soft] : soft [Abdomen Tenderness] : non-tender [Cervical Lymph Nodes Enlarged Posterior Bilaterally] : posterior cervical [Cervical Lymph Nodes Enlarged Anterior Bilaterally] : anterior cervical [Supraclavicular Lymph Nodes Enlarged Bilaterally] : supraclavicular [No CVA Tenderness] : no ~M costovertebral angle tenderness [No Spinal Tenderness] : no spinal tenderness [Abnormal Walk] : normal gait [Musculoskeletal - Swelling] : no joint swelling seen [] : no rash [No Focal Deficits] : no focal deficits [Oriented To Time, Place, And Person] : oriented to person, place, and time [Affect] : the affect was normal [Mood] : the mood was normal [FreeTextEntry1] : scattered freckles
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
## 2021-02-03 NOTE — REVIEW OF SYSTEMS
[see HPI] : see HPI [Negative] : Respiratory [Dysuria] : no dysuria [Vaginal Discharge] : no vaginal discharge [Dizziness] : no dizziness [Fainting] : no fainting [Limb Weakness] : no limb weakness [Difficulty Walking] : no difficulty walking

## 2021-02-04 LAB
BASOPHILS # BLD AUTO: 0.04 K/UL
BASOPHILS NFR BLD AUTO: 0.6 %
EOSINOPHIL # BLD AUTO: 0.39 K/UL
EOSINOPHIL NFR BLD AUTO: 5.5 %
ESTIMATED AVERAGE GLUCOSE: 114 MG/DL
FERRITIN SERPL-MCNC: 28 NG/ML
HBA1C MFR BLD HPLC: 5.6 %
HCT VFR BLD CALC: 35.4 %
HGB BLD-MCNC: 11 G/DL
IMM GRANULOCYTES NFR BLD AUTO: 0.1 %
IRON SATN MFR SERPL: 10 %
IRON SERPL-MCNC: 34 UG/DL
LYMPHOCYTES # BLD AUTO: 2.9 K/UL
LYMPHOCYTES NFR BLD AUTO: 41.1 %
MAN DIFF?: NORMAL
MCHC RBC-ENTMCNC: 25.8 PG
MCHC RBC-ENTMCNC: 31.1 GM/DL
MCV RBC AUTO: 83.1 FL
MONOCYTES # BLD AUTO: 0.39 K/UL
MONOCYTES NFR BLD AUTO: 5.5 %
NEUTROPHILS # BLD AUTO: 3.33 K/UL
NEUTROPHILS NFR BLD AUTO: 47.2 %
PLATELET # BLD AUTO: 322 K/UL
RBC # BLD: 4.26 M/UL
RBC # FLD: 14.5 %
TIBC SERPL-MCNC: 345 UG/DL
UIBC SERPL-MCNC: 310 UG/DL
WBC # FLD AUTO: 7.06 K/UL

## 2021-02-10 LAB — HEMOCCULT STL QL IA: NEGATIVE

## 2021-02-15 ENCOUNTER — TRANSCRIPTION ENCOUNTER (OUTPATIENT)
Age: 36
End: 2021-02-15

## 2021-02-16 ENCOUNTER — NON-APPOINTMENT (OUTPATIENT)
Age: 36
End: 2021-02-16

## 2021-03-12 ENCOUNTER — APPOINTMENT (OUTPATIENT)
Dept: ORTHOPEDIC SURGERY | Facility: CLINIC | Age: 36
End: 2021-03-12

## 2021-06-10 ENCOUNTER — APPOINTMENT (OUTPATIENT)
Dept: INTERNAL MEDICINE | Facility: CLINIC | Age: 36
End: 2021-06-10
Payer: COMMERCIAL

## 2021-06-16 ENCOUNTER — APPOINTMENT (OUTPATIENT)
Dept: INTERNAL MEDICINE | Facility: CLINIC | Age: 36
End: 2021-06-16

## 2021-06-17 ENCOUNTER — APPOINTMENT (OUTPATIENT)
Dept: INTERNAL MEDICINE | Facility: CLINIC | Age: 36
End: 2021-06-17
Payer: COMMERCIAL

## 2021-06-17 VITALS
TEMPERATURE: 98.6 F | WEIGHT: 157 LBS | RESPIRATION RATE: 14 BRPM | OXYGEN SATURATION: 99 % | HEIGHT: 61 IN | BODY MASS INDEX: 29.64 KG/M2 | SYSTOLIC BLOOD PRESSURE: 94 MMHG | HEART RATE: 66 BPM | DIASTOLIC BLOOD PRESSURE: 62 MMHG

## 2021-06-17 DIAGNOSIS — J45.909 UNSPECIFIED ASTHMA, UNCOMPLICATED: ICD-10-CM

## 2021-06-17 DIAGNOSIS — E55.9 VITAMIN D DEFICIENCY, UNSPECIFIED: ICD-10-CM

## 2021-06-17 DIAGNOSIS — M54.5 LOW BACK PAIN: ICD-10-CM

## 2021-06-17 DIAGNOSIS — Z23 ENCOUNTER FOR IMMUNIZATION: ICD-10-CM

## 2021-06-17 PROBLEM — Z00.00 ENCOUNTER FOR PREVENTIVE HEALTH EXAMINATION: Noted: 2021-06-17

## 2021-06-17 PROCEDURE — 36415 COLL VENOUS BLD VENIPUNCTURE: CPT

## 2021-06-17 PROCEDURE — 99213 OFFICE O/P EST LOW 20 MIN: CPT | Mod: 25

## 2021-06-18 PROBLEM — Z23 ENCOUNTER FOR IMMUNIZATION: Status: RESOLVED | Noted: 2021-06-18 | Resolved: 2021-06-18

## 2021-06-18 PROBLEM — M54.5 LOW BACK PAIN: Status: ACTIVE | Noted: 2021-06-18

## 2021-06-18 LAB
25(OH)D3 SERPL-MCNC: 24.8 NG/ML
BASOPHILS # BLD AUTO: 0.06 K/UL
BASOPHILS NFR BLD AUTO: 0.7 %
EOSINOPHIL # BLD AUTO: 0.48 K/UL
EOSINOPHIL NFR BLD AUTO: 5.4 %
ESTIMATED AVERAGE GLUCOSE: 111 MG/DL
FERRITIN SERPL-MCNC: 41 NG/ML
HBA1C MFR BLD HPLC: 5.5 %
HCT VFR BLD CALC: 36 %
HGB BLD-MCNC: 11.3 G/DL
IMM GRANULOCYTES NFR BLD AUTO: 0.1 %
IRON SATN MFR SERPL: 20 %
IRON SERPL-MCNC: 72 UG/DL
LYMPHOCYTES # BLD AUTO: 3.57 K/UL
LYMPHOCYTES NFR BLD AUTO: 40.1 %
MAN DIFF?: NORMAL
MCHC RBC-ENTMCNC: 26.8 PG
MCHC RBC-ENTMCNC: 31.4 GM/DL
MCV RBC AUTO: 85.5 FL
MONOCYTES # BLD AUTO: 0.43 K/UL
MONOCYTES NFR BLD AUTO: 4.8 %
NEUTROPHILS # BLD AUTO: 4.36 K/UL
NEUTROPHILS NFR BLD AUTO: 48.9 %
PLATELET # BLD AUTO: 359 K/UL
RBC # BLD: 4.21 M/UL
RBC # FLD: 13.6 %
TIBC SERPL-MCNC: 356 UG/DL
UIBC SERPL-MCNC: 284 UG/DL
WBC # FLD AUTO: 8.91 K/UL
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
## 2021-06-18 NOTE — REVIEW OF SYSTEMS
[see HPI] : see HPI [Negative] : Neurological [Chest Pain] : no chest pain [Palpitations] : no palpitations [Leg Claudication] : no intermittent leg claudication [Dysuria] : no dysuria [Vaginal Discharge] : no vaginal discharge

## 2021-06-21 LAB
HGB A MFR BLD: 97.3 %
HGB A2 MFR BLD: 2.7 %
HGB FRACT BLD-IMP: NORMAL

## 2021-07-12 ENCOUNTER — NON-APPOINTMENT (OUTPATIENT)
Age: 36
End: 2021-07-12

## 2021-09-22 ENCOUNTER — NON-APPOINTMENT (OUTPATIENT)
Age: 36
End: 2021-09-22

## 2021-09-23 ENCOUNTER — APPOINTMENT (OUTPATIENT)
Dept: INTERNAL MEDICINE | Facility: CLINIC | Age: 36
End: 2021-09-23
Payer: COMMERCIAL

## 2021-09-23 VITALS
TEMPERATURE: 98.3 F | HEART RATE: 83 BPM | SYSTOLIC BLOOD PRESSURE: 108 MMHG | DIASTOLIC BLOOD PRESSURE: 72 MMHG | OXYGEN SATURATION: 98 % | WEIGHT: 154 LBS | BODY MASS INDEX: 29.07 KG/M2 | HEIGHT: 61 IN

## 2021-09-23 VITALS
SYSTOLIC BLOOD PRESSURE: 108 MMHG | HEART RATE: 83 BPM | WEIGHT: 154 LBS | BODY MASS INDEX: 29.07 KG/M2 | TEMPERATURE: 98.3 F | HEIGHT: 61 IN | DIASTOLIC BLOOD PRESSURE: 72 MMHG

## 2021-09-23 DIAGNOSIS — N39.0 URINARY TRACT INFECTION, SITE NOT SPECIFIED: ICD-10-CM

## 2021-09-23 DIAGNOSIS — R53.81 OTHER MALAISE: ICD-10-CM

## 2021-09-23 DIAGNOSIS — R53.83 OTHER MALAISE: ICD-10-CM

## 2021-09-23 PROCEDURE — 99214 OFFICE O/P EST MOD 30 MIN: CPT | Mod: 25

## 2021-09-23 PROCEDURE — 36415 COLL VENOUS BLD VENIPUNCTURE: CPT

## 2021-09-23 RX ORDER — CIPROFLOXACIN HYDROCHLORIDE 500 MG/1
500 TABLET, FILM COATED ORAL
Qty: 14 | Refills: 0 | Status: DISCONTINUED | COMMUNITY
Start: 2021-06-23 | End: 2021-09-23

## 2021-09-23 RX ORDER — HYDROCORTISONE ACETATE 25 MG/1
25 SUPPOSITORY RECTAL TWICE DAILY
Qty: 56 | Refills: 1 | Status: DISCONTINUED | COMMUNITY
Start: 2019-12-17 | End: 2021-09-23
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
## 2021-09-24 LAB
BASOPHILS # BLD AUTO: 0.06 K/UL
BASOPHILS NFR BLD AUTO: 0.7 %
EOSINOPHIL # BLD AUTO: 0.57 K/UL
EOSINOPHIL NFR BLD AUTO: 6.6 %
FERRITIN SERPL-MCNC: 17 NG/ML
HCT VFR BLD CALC: 35.6 %
HGB BLD-MCNC: 10.9 G/DL
IMM GRANULOCYTES NFR BLD AUTO: 0.2 %
IRON SATN MFR SERPL: 9 %
IRON SERPL-MCNC: 35 UG/DL
LYMPHOCYTES # BLD AUTO: 3.04 K/UL
LYMPHOCYTES NFR BLD AUTO: 35.4 %
MAN DIFF?: NORMAL
MCHC RBC-ENTMCNC: 25.4 PG
MCHC RBC-ENTMCNC: 30.6 GM/DL
MCV RBC AUTO: 83 FL
MONOCYTES # BLD AUTO: 0.55 K/UL
MONOCYTES NFR BLD AUTO: 6.4 %
NEUTROPHILS # BLD AUTO: 4.35 K/UL
NEUTROPHILS NFR BLD AUTO: 50.7 %
PLATELET # BLD AUTO: 386 K/UL
RBC # BLD: 4.29 M/UL
RBC # FLD: 13.9 %
RHEUMATOID FACT SER QL: 11 IU/ML
TIBC SERPL-MCNC: 378 UG/DL
UIBC SERPL-MCNC: 343 UG/DL
VIT B12 SERPL-MCNC: 656 PG/ML
WBC # FLD AUTO: 8.59 K/UL

## 2021-09-27 LAB
ANA PAT FLD IF-IMP: ABNORMAL
ANA SER IF-ACNC: ABNORMAL
CCP AB SER IA-ACNC: <8 UNITS
DSDNA AB SER-ACNC: <12 IU/ML
RF+CCP IGG SER-IMP: NEGATIVE

## 2021-12-01 ENCOUNTER — NON-APPOINTMENT (OUTPATIENT)
Age: 36
End: 2021-12-01

## 2021-12-01 DIAGNOSIS — Z01.84 ENCOUNTER FOR ANTIBODY RESPONSE EXAMINATION: ICD-10-CM

## 2021-12-03 LAB
MEV IGG FLD QL IA: >300 AU/ML
MEV IGG+IGM SER-IMP: POSITIVE
MUV AB SER-ACNC: POSITIVE
MUV IGG SER QL IA: 29.7 AU/ML
RUBV IGG FLD-ACNC: 5.9 INDEX
RUBV IGG SER-IMP: POSITIVE

## 2022-01-18 ENCOUNTER — APPOINTMENT (OUTPATIENT)
Dept: INTERNAL MEDICINE | Facility: CLINIC | Age: 37
End: 2022-01-18
Payer: MEDICAID

## 2022-01-18 VITALS
DIASTOLIC BLOOD PRESSURE: 78 MMHG | OXYGEN SATURATION: 98 % | HEART RATE: 110 BPM | SYSTOLIC BLOOD PRESSURE: 122 MMHG | TEMPERATURE: 98.1 F | WEIGHT: 167 LBS | HEIGHT: 61 IN | BODY MASS INDEX: 31.53 KG/M2

## 2022-01-18 DIAGNOSIS — B37.3 CANDIDIASIS OF VULVA AND VAGINA: ICD-10-CM

## 2022-01-18 DIAGNOSIS — R00.2 PALPITATIONS: ICD-10-CM

## 2022-01-18 PROCEDURE — 99214 OFFICE O/P EST MOD 30 MIN: CPT | Mod: 25
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
## 2022-01-19 LAB
ALBUMIN SERPL ELPH-MCNC: 4.5 G/DL
ALP BLD-CCNC: 87 U/L
ALT SERPL-CCNC: 24 U/L
ANION GAP SERPL CALC-SCNC: 14 MMOL/L
AST SERPL-CCNC: 22 U/L
BASOPHILS # BLD AUTO: 0.05 K/UL
BASOPHILS NFR BLD AUTO: 0.4 %
BILIRUB SERPL-MCNC: 0.2 MG/DL
BUN SERPL-MCNC: 12 MG/DL
CALCIUM SERPL-MCNC: 9.4 MG/DL
CHLORIDE SERPL-SCNC: 102 MMOL/L
CO2 SERPL-SCNC: 22 MMOL/L
CREAT SERPL-MCNC: 0.66 MG/DL
EOSINOPHIL # BLD AUTO: 0.91 K/UL
EOSINOPHIL NFR BLD AUTO: 7.5 %
FERRITIN SERPL-MCNC: 21 NG/ML
GLUCOSE SERPL-MCNC: 113 MG/DL
HCT VFR BLD CALC: 34.8 %
HGB BLD-MCNC: 10.9 G/DL
IMM GRANULOCYTES NFR BLD AUTO: 0.2 %
LYMPHOCYTES # BLD AUTO: 4.22 K/UL
LYMPHOCYTES NFR BLD AUTO: 34.9 %
MAN DIFF?: NORMAL
MCHC RBC-ENTMCNC: 25.2 PG
MCHC RBC-ENTMCNC: 31.3 GM/DL
MCV RBC AUTO: 80.6 FL
MONOCYTES # BLD AUTO: 0.46 K/UL
MONOCYTES NFR BLD AUTO: 3.8 %
NEUTROPHILS # BLD AUTO: 6.43 K/UL
NEUTROPHILS NFR BLD AUTO: 53.2 %
PLATELET # BLD AUTO: 389 K/UL
POTASSIUM SERPL-SCNC: 3.9 MMOL/L
PROT SERPL-MCNC: 7.5 G/DL
RBC # BLD: 4.32 M/UL
RBC # FLD: 13.6 %
SODIUM SERPL-SCNC: 138 MMOL/L
WBC # FLD AUTO: 12.1 K/UL

## 2022-01-22 ENCOUNTER — APPOINTMENT (OUTPATIENT)
Dept: ULTRASOUND IMAGING | Facility: CLINIC | Age: 37
End: 2022-01-22

## 2022-01-24 ENCOUNTER — TRANSCRIPTION ENCOUNTER (OUTPATIENT)
Age: 37
End: 2022-01-24

## 2022-02-01 ENCOUNTER — RESULT REVIEW (OUTPATIENT)
Age: 37
End: 2022-02-01

## 2022-02-01 ENCOUNTER — APPOINTMENT (OUTPATIENT)
Dept: ULTRASOUND IMAGING | Facility: HOSPITAL | Age: 37
End: 2022-02-01

## 2022-02-01 ENCOUNTER — OUTPATIENT (OUTPATIENT)
Dept: OUTPATIENT SERVICES | Facility: HOSPITAL | Age: 37
LOS: 1 days | End: 2022-02-01
Payer: MEDICAID

## 2022-02-01 DIAGNOSIS — Z98.89 OTHER SPECIFIED POSTPROCEDURAL STATES: Chronic | ICD-10-CM

## 2022-02-01 DIAGNOSIS — M79.89 OTHER SPECIFIED SOFT TISSUE DISORDERS: ICD-10-CM

## 2022-02-01 PROCEDURE — 93970 EXTREMITY STUDY: CPT

## 2022-02-01 PROCEDURE — 93970 EXTREMITY STUDY: CPT | Mod: 26

## 2022-02-02 ENCOUNTER — NON-APPOINTMENT (OUTPATIENT)
Age: 37
End: 2022-02-02

## 2022-02-08 DIAGNOSIS — R07.9 CHEST PAIN, UNSPECIFIED: ICD-10-CM

## 2022-02-11 ENCOUNTER — TRANSCRIPTION ENCOUNTER (OUTPATIENT)
Age: 37
End: 2022-02-11

## 2022-02-11 ENCOUNTER — APPOINTMENT (OUTPATIENT)
Dept: OBGYN | Facility: CLINIC | Age: 37
End: 2022-02-11
Payer: MEDICAID

## 2022-02-11 VITALS
WEIGHT: 169.13 LBS | BODY MASS INDEX: 31.93 KG/M2 | HEIGHT: 61 IN | SYSTOLIC BLOOD PRESSURE: 127 MMHG | DIASTOLIC BLOOD PRESSURE: 78 MMHG | HEART RATE: 74 BPM

## 2022-02-11 DIAGNOSIS — Z01.419 ENCOUNTER FOR GYNECOLOGICAL EXAMINATION (GENERAL) (ROUTINE) W/OUT ABNORMAL FINDINGS: ICD-10-CM

## 2022-02-11 DIAGNOSIS — N76.0 ACUTE VAGINITIS: ICD-10-CM

## 2022-02-11 DIAGNOSIS — L68.0 HIRSUTISM: ICD-10-CM

## 2022-02-11 PROCEDURE — 99395 PREV VISIT EST AGE 18-39: CPT

## 2022-02-11 PROCEDURE — 99213 OFFICE O/P EST LOW 20 MIN: CPT | Mod: 25

## 2022-02-11 NOTE — HISTORY OF PRESENT ILLNESS
[Currently Active] : currently active [Men] : men [FreeTextEntry3] : no contr--says semen all falls out--pt educated on conception

## 2022-02-13 LAB — HPV HIGH+LOW RISK DNA PNL CVX: NOT DETECTED

## 2022-02-14 LAB
CANDIDA VAG CYTO: DETECTED
DHEA-S SERPL-MCNC: 346 UG/DL
G VAGINALIS+PREV SP MTYP VAG QL MICRO: NOT DETECTED
T VAGINALIS VAG QL WET PREP: NOT DETECTED
TESTOST SERPL-MCNC: 31.3 NG/DL

## 2022-02-20 LAB — CYTOLOGY CVX/VAG DOC THIN PREP: NORMAL

## 2022-02-22 ENCOUNTER — OUTPATIENT (OUTPATIENT)
Dept: OUTPATIENT SERVICES | Facility: HOSPITAL | Age: 37
LOS: 1 days | End: 2022-02-22
Payer: MEDICAID

## 2022-02-22 ENCOUNTER — APPOINTMENT (OUTPATIENT)
Dept: ULTRASOUND IMAGING | Facility: CLINIC | Age: 37
End: 2022-02-22
Payer: MEDICAID

## 2022-02-22 DIAGNOSIS — Z98.89 OTHER SPECIFIED POSTPROCEDURAL STATES: Chronic | ICD-10-CM

## 2022-02-22 PROCEDURE — 76856 US EXAM PELVIC COMPLETE: CPT | Mod: 26

## 2022-02-22 PROCEDURE — 76830 TRANSVAGINAL US NON-OB: CPT

## 2022-02-22 PROCEDURE — 76830 TRANSVAGINAL US NON-OB: CPT | Mod: 26

## 2022-02-22 PROCEDURE — 76856 US EXAM PELVIC COMPLETE: CPT

## 2022-02-23 ENCOUNTER — NON-APPOINTMENT (OUTPATIENT)
Age: 37
End: 2022-02-23

## 2022-02-23 ENCOUNTER — APPOINTMENT (OUTPATIENT)
Dept: OBGYN | Facility: CLINIC | Age: 37
End: 2022-02-23

## 2022-02-23 DIAGNOSIS — R93.89 ABNORMAL FINDINGS ON DIAGNOSTIC IMAGING OF OTHER SPECIFIED BODY STRUCTURES: ICD-10-CM

## 2022-02-24 ENCOUNTER — APPOINTMENT (OUTPATIENT)
Dept: VASCULAR SURGERY | Facility: CLINIC | Age: 37
End: 2022-02-24
Payer: MEDICAID

## 2022-02-24 ENCOUNTER — NON-APPOINTMENT (OUTPATIENT)
Age: 37
End: 2022-02-24

## 2022-02-24 VITALS
BODY MASS INDEX: 31.34 KG/M2 | SYSTOLIC BLOOD PRESSURE: 113 MMHG | HEIGHT: 61 IN | DIASTOLIC BLOOD PRESSURE: 75 MMHG | HEART RATE: 80 BPM | WEIGHT: 166 LBS | TEMPERATURE: 97.5 F

## 2022-02-24 PROCEDURE — 93970 EXTREMITY STUDY: CPT

## 2022-02-24 PROCEDURE — 99204 OFFICE O/P NEW MOD 45 MIN: CPT

## 2022-02-24 NOTE — ASSESSMENT
[FreeTextEntry1] : Ms. CORTES ROBLES is a 36 year with persistent and worsening bilateral lower extremity venous insufficiency, CEAP classification C3 which is unresponsive to at least 3 months of compression stockings 20-30 mmHg, leg elevation, exercise and over the counter pain medication_(Ibuprofen). Patient has complaints of worsening bilateral leg discomfort with swelling, fatigue, heaviness, achiness, cramping, restlessness. The patient’s symptoms interfere with their ADL’s, such as walking, shopping and house work, and their ability to work and exercise. Patient has intact pulses in both legs without evidence of arterial insufficiency. \par Treatment is indicated not for cosmetic reasons but for symptomatic venous reflux disease with symptoms which is refractory to conservative therapy. Venous duplex study demonstrates bilateral  lower extremity venous insufficiency. The need for definitive effective treatment is based on severe, interfering and worsening reflux symptoms with evidence of venous insufficiency on venous ultrasound. \par Patient is a candidate for endovenous ablation treatment of the left GSV \par The risks and benefits of endovenous ablation treatment versus continued conservative management were discussed with the patient. Patient chooses endovenous ablation treatments. Treatment plan to be scheduled.\par

## 2022-02-24 NOTE — HISTORY OF PRESENT ILLNESS
[FreeTextEntry1] : Ms. CORTES ROBLES is a 36 year F who presents for initial evaluation of bilateral leg pain for the past several  months. \par Ms. ROBLES leg discomfort is associated with leg swelling, fatigue, heaviness, achiness, restlessness, muscle cramping, itchiness, dry, flaky skin.\par Her symptoms have persisted despite conservative management with leg elevation, exercise, attempted weight loss, over-the-counter medications (ibuprofen), and compression stocking use for more than 3 months. \par Her symptoms are aggravated by  weight bearing, prolonged standing, prolonged sitting, extended travel, exercise \par Nothing helps to alleviate patient's symptoms. \par Her symptoms interfere with the her ADLs such as work, shopping and housekeeping. \par Ms. 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 \par Previous treatment, vein procedures and vein surgeries include: _wearing compression stockings for more than 3 months with little or no relief

## 2022-02-24 NOTE — DATA REVIEWED
[No studies available for review at this time.] : No studies available for review at this time. [FreeTextEntry1] : L GSV reflux\par SFJ diameter 6.1mm\par \par R GSV reflux in calf only

## 2022-02-24 NOTE — PHYSICAL EXAM
[FreeTextEntry1] : LE vein findings: \par Varicosities none\par Edema  bilateral \par Skin changes  dry, flaky skin\par CEAP classification C3\par Palpable DP pulses bilaterally\par \par

## 2022-02-25 ENCOUNTER — APPOINTMENT (OUTPATIENT)
Dept: CARDIOLOGY | Facility: CLINIC | Age: 37
End: 2022-02-25
Payer: MEDICAID

## 2022-02-25 ENCOUNTER — NON-APPOINTMENT (OUTPATIENT)
Age: 37
End: 2022-02-25

## 2022-02-25 VITALS
OXYGEN SATURATION: 97 % | HEIGHT: 61 IN | SYSTOLIC BLOOD PRESSURE: 116 MMHG | BODY MASS INDEX: 31.34 KG/M2 | WEIGHT: 166 LBS | HEART RATE: 80 BPM | DIASTOLIC BLOOD PRESSURE: 78 MMHG

## 2022-02-25 PROCEDURE — 93000 ELECTROCARDIOGRAM COMPLETE: CPT

## 2022-02-25 PROCEDURE — 99203 OFFICE O/P NEW LOW 30 MIN: CPT

## 2022-02-25 RX ORDER — CHROMIUM 200 MCG
25 MCG TABLET ORAL
Refills: 0 | Status: DISCONTINUED | COMMUNITY
End: 2022-02-25

## 2022-02-25 RX ORDER — ACETAMINOPHEN 325 MG
TABLET ORAL
Refills: 0 | Status: DISCONTINUED | COMMUNITY
End: 2022-02-25

## 2022-02-25 RX ORDER — SPIRONOLACTONE 50 MG/1
50 TABLET ORAL DAILY
Qty: 90 | Refills: 3 | Status: DISCONTINUED | COMMUNITY
Start: 2022-02-14 | End: 2022-02-25

## 2022-02-25 RX ORDER — CLOTRIMAZOLE 10 MG/G
1 CREAM TOPICAL
Qty: 1 | Refills: 1 | Status: DISCONTINUED | COMMUNITY
Start: 2020-09-14 | End: 2022-02-25

## 2022-02-25 RX ORDER — FLUCONAZOLE 150 MG/1
150 TABLET ORAL
Qty: 2 | Refills: 3 | Status: DISCONTINUED | COMMUNITY
Start: 2022-02-14 | End: 2022-02-25

## 2022-02-25 RX ORDER — TERCONAZOLE 8 MG/G
0.8 CREAM VAGINAL
Qty: 2 | Refills: 0 | Status: DISCONTINUED | COMMUNITY
Start: 2022-01-18 | End: 2022-02-25

## 2022-02-26 NOTE — REVIEW OF SYSTEMS
[Chest Discomfort] : chest discomfort [Negative] : Psychiatric [SOB] : no shortness of breath [Dyspnea on exertion] : not dyspnea during exertion [Lower Ext Edema] : no extremity edema [Leg Claudication] : no intermittent leg claudication [Palpitations] : no palpitations [Orthopnea] : no orthopnea [Syncope] : no syncope [PND] : no PND [FreeTextEntry5] : (see HPI)

## 2022-02-26 NOTE — PHYSICAL EXAM
[Normal Conjunctiva] : normal conjunctiva [Normal] : alert and oriented, normal memory [de-identified] : Obese

## 2022-02-26 NOTE — REVIEW OF SYSTEMS
[Chest Discomfort] : chest discomfort [Negative] : Psychiatric [SOB] : no shortness of breath [Dyspnea on exertion] : not dyspnea during exertion [Leg Claudication] : no intermittent leg claudication [Lower Ext Edema] : no extremity edema [Palpitations] : no palpitations [Orthopnea] : no orthopnea [Syncope] : no syncope [PND] : no PND [FreeTextEntry5] : (see HPI)

## 2022-02-26 NOTE — ASSESSMENT
[FreeTextEntry1] : The history is not consistent with any serious cause of chest pain. Furthermore, the statistical likelihood of coronary artery disease is extremely low. \par \par Plan:\par Reassured patient that her current risk of CV disease is extremely low, and that this history in to consistent thereof.\par Advised patient to lose weight for long-term health benefits. .

## 2022-02-26 NOTE — REASON FOR VISIT
[FreeTextEntry1] : "pinchy pain" on the left side of the chest - sporadically - negative stress test 2020\par worse with deep breathng\par no strenuous activity\par maybe three times per day - lasts 2-3 days, persistently\par no cigarettes\par dad had MI in 50s, but smoked "a lot". \par Does not exercise. \par Works in an elementary school -  [Other: ____] : [unfilled]

## 2022-02-26 NOTE — HISTORY OF PRESENT ILLNESS
[FreeTextEntry1] : Ms. Rabia Baires is a very pleasant 36 year old woman with a history of sporadic episodes of what she describes as a "pinching" pain on the left side of the chest  These episodes can occur "a couple of times per year" and last for "a couple of days" in duration when they occur. Her last such episode was one month ago, She states that these pains are pleuritic in nature; there is no positional or exertional component. \par \par A negative exercise stress test, dated September 2020, is scanned into the [facility] EMR (in addition to the results of the test, the date of the test confirms the period of time that these symptoms have been present). There are no conventional risk-factors for coronary disease.

## 2022-02-26 NOTE — HISTORY OF PRESENT ILLNESS
[FreeTextEntry1] : Ms. Rabia Baires is a very pleasant 36 year old woman with a history of sporadic episodes of what she describes as a "pinching" pain on the left side of the chest  These episodes can occur "a couple of times per year" and last for "a couple of days" in duration when they occur. Her last such episode was one month ago, She states that these pains are pleuritic in nature; there is no positional or exertional component. \par \par A negative exercise stress test, dated September 2020, is scanned into the [facility] EMR (in addition to the results of the test, the date of the test confirms the period of time that these symptoms have been present). There are no conventional risk-factors for coronary disease.

## 2022-02-26 NOTE — PHYSICAL EXAM
[Normal Conjunctiva] : normal conjunctiva [Normal] : alert and oriented, normal memory [de-identified] : Obese

## 2022-03-06 ENCOUNTER — OUTPATIENT (OUTPATIENT)
Dept: OUTPATIENT SERVICES | Facility: HOSPITAL | Age: 37
LOS: 1 days | End: 2022-03-06
Payer: MEDICAID

## 2022-03-06 ENCOUNTER — APPOINTMENT (OUTPATIENT)
Dept: MRI IMAGING | Facility: CLINIC | Age: 37
End: 2022-03-06
Payer: MEDICAID

## 2022-03-06 DIAGNOSIS — R93.89 ABNORMAL FINDINGS ON DIAGNOSTIC IMAGING OF OTHER SPECIFIED BODY STRUCTURES: ICD-10-CM

## 2022-03-06 DIAGNOSIS — Z98.89 OTHER SPECIFIED POSTPROCEDURAL STATES: Chronic | ICD-10-CM

## 2022-03-06 PROCEDURE — 72197 MRI PELVIS W/O & W/DYE: CPT

## 2022-03-06 PROCEDURE — 72197 MRI PELVIS W/O & W/DYE: CPT | Mod: 26

## 2022-03-06 PROCEDURE — A9585: CPT

## 2022-03-08 ENCOUNTER — NON-APPOINTMENT (OUTPATIENT)
Age: 37
End: 2022-03-08

## 2022-03-11 ENCOUNTER — APPOINTMENT (OUTPATIENT)
Dept: INTERNAL MEDICINE | Facility: CLINIC | Age: 37
End: 2022-03-11
Payer: MEDICAID

## 2022-03-11 VITALS
HEART RATE: 75 BPM | SYSTOLIC BLOOD PRESSURE: 96 MMHG | DIASTOLIC BLOOD PRESSURE: 58 MMHG | WEIGHT: 172 LBS | OXYGEN SATURATION: 98 % | TEMPERATURE: 98.5 F | BODY MASS INDEX: 32.5 KG/M2

## 2022-03-11 PROCEDURE — G0444 DEPRESSION SCREEN ANNUAL: CPT | Mod: 59

## 2022-03-11 PROCEDURE — 99395 PREV VISIT EST AGE 18-39: CPT | Mod: 25

## 2022-03-11 PROCEDURE — G0442 ANNUAL ALCOHOL SCREEN 15 MIN: CPT

## 2022-03-11 NOTE — HEALTH RISK ASSESSMENT
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
## 2022-03-14 LAB
ALBUMIN SERPL ELPH-MCNC: 4.5 G/DL
ALP BLD-CCNC: 78 U/L
ALT SERPL-CCNC: 21 U/L
ANION GAP SERPL CALC-SCNC: 11 MMOL/L
APPEARANCE: CLEAR
AST SERPL-CCNC: 20 U/L
BASOPHILS # BLD AUTO: 0.06 K/UL
BASOPHILS NFR BLD AUTO: 0.5 %
BILIRUB SERPL-MCNC: 0.3 MG/DL
BILIRUBIN URINE: NEGATIVE
BLOOD URINE: NEGATIVE
BUN SERPL-MCNC: 13 MG/DL
CALCIUM SERPL-MCNC: 9.5 MG/DL
CHLORIDE SERPL-SCNC: 105 MMOL/L
CHOLEST SERPL-MCNC: 183 MG/DL
CO2 SERPL-SCNC: 22 MMOL/L
COLOR: NORMAL
CREAT SERPL-MCNC: 0.72 MG/DL
EGFR: 112 ML/MIN/1.73M2
EOSINOPHIL # BLD AUTO: 0.62 K/UL
EOSINOPHIL NFR BLD AUTO: 5.7 %
FERRITIN SERPL-MCNC: 40 NG/ML
GLUCOSE QUALITATIVE U: NEGATIVE
GLUCOSE SERPL-MCNC: 93 MG/DL
HCT VFR BLD CALC: 34.8 %
HDLC SERPL-MCNC: 51 MG/DL
HGB BLD-MCNC: 10.7 G/DL
IMM GRANULOCYTES NFR BLD AUTO: 0.3 %
KETONES URINE: NEGATIVE
LDLC SERPL CALC-MCNC: 117 MG/DL
LEUKOCYTE ESTERASE URINE: NEGATIVE
LYMPHOCYTES # BLD AUTO: 4.14 K/UL
LYMPHOCYTES NFR BLD AUTO: 37.7 %
MAN DIFF?: NORMAL
MCHC RBC-ENTMCNC: 25.3 PG
MCHC RBC-ENTMCNC: 30.7 GM/DL
MCV RBC AUTO: 82.3 FL
MONOCYTES # BLD AUTO: 0.55 K/UL
MONOCYTES NFR BLD AUTO: 5 %
NEUTROPHILS # BLD AUTO: 5.57 K/UL
NEUTROPHILS NFR BLD AUTO: 50.8 %
NITRITE URINE: NEGATIVE
NONHDLC SERPL-MCNC: 132 MG/DL
PH URINE: 6.5
PLATELET # BLD AUTO: 346 K/UL
POTASSIUM SERPL-SCNC: 4.5 MMOL/L
PROT SERPL-MCNC: 7.5 G/DL
PROTEIN URINE: NEGATIVE
RBC # BLD: 4.23 M/UL
RBC # FLD: 15.7 %
SODIUM SERPL-SCNC: 138 MMOL/L
SPECIFIC GRAVITY URINE: 1.02
TRIGL SERPL-MCNC: 77 MG/DL
TSH SERPL-ACNC: 1.27 UIU/ML
UROBILINOGEN URINE: NORMAL
WBC # FLD AUTO: 10.97 K/UL

## 2022-04-11 PROBLEM — Z11.59 SCREENING FOR VIRAL DISEASE: Status: RESOLVED | Noted: 2020-09-14 | Resolved: 2022-04-11

## 2022-04-13 DIAGNOSIS — I87.2 VENOUS INSUFFICIENCY (CHRONIC) (PERIPHERAL): ICD-10-CM

## 2022-04-13 RX ORDER — LIDOCAINE HYDROCHLORIDE 10 MG/ML
1 INJECTION, SOLUTION INFILTRATION; PERINEURAL
Qty: 50 | Refills: 0 | Status: ACTIVE | COMMUNITY
Start: 2022-04-13 | End: 1900-01-01

## 2022-04-13 RX ORDER — ALPRAZOLAM 0.5 MG/1
0.5 TABLET ORAL
Qty: 1 | Refills: 0 | Status: ACTIVE | COMMUNITY
Start: 2022-04-13 | End: 1900-01-01

## 2022-04-15 ENCOUNTER — APPOINTMENT (OUTPATIENT)
Dept: VASCULAR SURGERY | Facility: CLINIC | Age: 37
End: 2022-04-15

## 2022-04-21 ENCOUNTER — APPOINTMENT (OUTPATIENT)
Dept: VASCULAR SURGERY | Facility: CLINIC | Age: 37
End: 2022-04-21

## 2022-04-25 ENCOUNTER — APPOINTMENT (OUTPATIENT)
Dept: VASCULAR SURGERY | Facility: CLINIC | Age: 37
End: 2022-04-25

## 2022-05-24 ENCOUNTER — APPOINTMENT (OUTPATIENT)
Dept: VASCULAR SURGERY | Facility: CLINIC | Age: 37
End: 2022-05-24

## 2022-08-03 ENCOUNTER — APPOINTMENT (OUTPATIENT)
Dept: OBGYN | Facility: CLINIC | Age: 37
End: 2022-08-03

## 2022-08-16 ENCOUNTER — APPOINTMENT (OUTPATIENT)
Dept: INTERNAL MEDICINE | Facility: CLINIC | Age: 37
End: 2022-08-16

## 2022-08-16 VITALS
HEIGHT: 61 IN | HEART RATE: 74 BPM | BODY MASS INDEX: 31.72 KG/M2 | TEMPERATURE: 98.6 F | DIASTOLIC BLOOD PRESSURE: 60 MMHG | WEIGHT: 168 LBS | SYSTOLIC BLOOD PRESSURE: 116 MMHG

## 2022-08-16 DIAGNOSIS — N76.0 ACUTE VAGINITIS: ICD-10-CM

## 2022-08-16 DIAGNOSIS — M79.673 PAIN IN UNSPECIFIED FOOT: ICD-10-CM

## 2022-08-16 DIAGNOSIS — L29.2 PRURITUS VULVAE: ICD-10-CM

## 2022-08-16 PROCEDURE — 99214 OFFICE O/P EST MOD 30 MIN: CPT

## 2022-08-16 RX ORDER — HYDROCORTISONE 25 MG/G
2.5 CREAM TOPICAL
Qty: 1 | Refills: 0 | Status: ACTIVE | COMMUNITY
Start: 2022-08-16 | End: 1900-01-01

## 2022-08-16 RX ORDER — DOCUSATE SODIUM 100 MG
100 TABLET ORAL
Qty: 60 | Refills: 0 | Status: ACTIVE | COMMUNITY
Start: 2022-08-16 | End: 1900-01-01
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
## 2022-09-02 ENCOUNTER — NON-APPOINTMENT (OUTPATIENT)
Age: 37
End: 2022-09-02

## 2022-09-02 ENCOUNTER — APPOINTMENT (OUTPATIENT)
Dept: HEMOPHILIA TREATMENT | Facility: HOSPITAL | Age: 37
End: 2022-09-02

## 2022-09-02 ENCOUNTER — OUTPATIENT (OUTPATIENT)
Dept: OUTPATIENT SERVICES | Age: 37
LOS: 1 days | End: 2022-09-02

## 2022-09-02 VITALS
SYSTOLIC BLOOD PRESSURE: 114 MMHG | HEART RATE: 75 BPM | RESPIRATION RATE: 16 BRPM | BODY MASS INDEX: 33.01 KG/M2 | WEIGHT: 174.83 LBS | DIASTOLIC BLOOD PRESSURE: 74 MMHG | TEMPERATURE: 98.8 F | HEIGHT: 61 IN

## 2022-09-02 DIAGNOSIS — Z13.9 ENCOUNTER FOR SCREENING, UNSPECIFIED: ICD-10-CM

## 2022-09-02 DIAGNOSIS — D66 HEREDITARY FACTOR VIII DEFICIENCY: ICD-10-CM

## 2022-09-02 DIAGNOSIS — Z98.89 OTHER SPECIFIED POSTPROCEDURAL STATES: Chronic | ICD-10-CM

## 2022-09-02 NOTE — ASSESSMENT
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 Denies post partum bleeding from c sections, no other surgical challenges. \par Denies gum bleeds, nosebleed, hematuria, melena/hematochezia. \par \par -FVII is considered the most common of rare bleeding disorders its incidence is estimated at 1 per 300,000-500,000. It is inherited in an autosomal recessive fashion, meaning both parents must carry the gene to pass it on to their children; it affects men and women equally.\par -Discussed FVII deficiency is a mild bleeding disorder, at risk for mucocutaneous bleeds, bleeding with major trauma and surgeries. Discussed there is a poor correlation with FVII level and bleeding phenotype, however, bleeding is uncommon with FVII levels > 10 - 20%. \par \par plan\par labs for FVII, iron studies\par will call with results and plan\par

## 2022-09-02 NOTE — REASON FOR VISIT
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

## 2022-09-02 NOTE — HISTORY OF PRESENT ILLNESS
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 Denies post partum bleeding from c sections, no other surgical challenges. \par Denies gum bleeds, nosebleed, hematuria, melena/hematochezia.

## 2022-09-06 ENCOUNTER — NON-APPOINTMENT (OUTPATIENT)
Age: 37
End: 2022-09-06

## 2022-09-07 LAB
ALBUMIN SERPL ELPH-MCNC: 4.5 G/DL
ALP BLD-CCNC: 91 U/L
ALT SERPL-CCNC: 31 U/L
ANION GAP SERPL CALC-SCNC: 13 MMOL/L
APTT 2H P INC PPP: NORMAL SEC
APTT BLD: 32.6 SEC
APTT IMM NP/PRE NP PPP: NORMAL SEC
AST SERPL-CCNC: 23 U/L
BASOPHILS # BLD AUTO: 0.02 K/UL
BASOPHILS NFR BLD AUTO: 0.2 %
BILIRUB SERPL-MCNC: 0.5 MG/DL
BUN SERPL-MCNC: 10 MG/DL
CALCIUM SERPL-MCNC: 9.9 MG/DL
CHLORIDE SERPL-SCNC: 106 MMOL/L
CO2 SERPL-SCNC: 21 MMOL/L
CREAT SERPL-MCNC: 0.52 MG/DL
EGFR: 125 ML/MIN/1.73M2
EOSINOPHIL # BLD AUTO: 0.49 K/UL
EOSINOPHIL NFR BLD AUTO: 5.2 %
FACT VII ACT/NOR PPP: 65.1 %
FERRITIN SERPL-MCNC: 32 NG/ML
GLUCOSE SERPL-MCNC: 88 MG/DL
HCT VFR BLD CALC: 36.2 %
HGB BLD-MCNC: 11.7 G/DL
IMM GRANULOCYTES NFR BLD AUTO: 0.2 %
INR PPP: 1.16 RATIO
IRON SATN MFR SERPL: 11 %
IRON SERPL-MCNC: 41 UG/DL
LYMPHOCYTES # BLD AUTO: 3.5 K/UL
LYMPHOCYTES NFR BLD AUTO: 37.2 %
MAN DIFF?: NORMAL
MCHC RBC-ENTMCNC: 25.9 PG
MCHC RBC-ENTMCNC: 32.3 GM/DL
MCV RBC AUTO: 80.3 FL
MONOCYTES # BLD AUTO: 0.48 K/UL
MONOCYTES NFR BLD AUTO: 5.1 %
NEUTROPHILS # BLD AUTO: 4.91 K/UL
NEUTROPHILS NFR BLD AUTO: 52.1 %
NPP NORMAL POOLED PLASMA: NORMAL SEC
PLATELET # BLD AUTO: 337 K/UL
POTASSIUM SERPL-SCNC: 4.6 MMOL/L
PROT SERPL-MCNC: 7.4 G/DL
PT BLD: 13.5 SEC
RBC # BLD: 4.51 M/UL
RBC # BLD: 4.51 M/UL
RBC # FLD: 14.4 %
RETICS # AUTO: 1.8 %
RETICS AGGREG/RBC NFR: 79.4 K/UL
SODIUM SERPL-SCNC: 140 MMOL/L
TIBC SERPL-MCNC: 361 UG/DL
UIBC SERPL-MCNC: 320 UG/DL
WBC # FLD AUTO: 9.42 K/UL

## 2022-09-13 DIAGNOSIS — D68.9 COAGULATION DEFECT, UNSPECIFIED: ICD-10-CM

## 2022-10-05 ENCOUNTER — APPOINTMENT (OUTPATIENT)
Dept: GASTROENTEROLOGY | Facility: CLINIC | Age: 37
End: 2022-10-05

## 2022-10-05 VITALS
HEIGHT: 61 IN | HEART RATE: 79 BPM | SYSTOLIC BLOOD PRESSURE: 112 MMHG | OXYGEN SATURATION: 99 % | WEIGHT: 174 LBS | DIASTOLIC BLOOD PRESSURE: 76 MMHG | BODY MASS INDEX: 32.85 KG/M2

## 2022-10-05 DIAGNOSIS — K64.9 UNSPECIFIED HEMORRHOIDS: ICD-10-CM

## 2022-10-05 PROCEDURE — 99214 OFFICE O/P EST MOD 30 MIN: CPT

## 2022-10-05 RX ORDER — POLYETHYLENE GLYCOL 3350 17 G/17G
17 POWDER, FOR SOLUTION ORAL DAILY
Qty: 30 | Refills: 3 | Status: ACTIVE | COMMUNITY
Start: 2022-10-05 | End: 1900-01-01

## 2022-10-05 NOTE — PHYSICAL EXAM
[None] : no edema [Normal] : normal bowel sounds, non-tender, no masses, soft, no no hepato-splenomegaly [No CVA Tenderness] : no CVA  tenderness [Abnormal Walk] : normal gait [] : no rash [Sensation] : the sensory exam was normal to light touch and pinprick [No Focal Deficits] : no focal deficits [Oriented To Time, Place, And Person] : oriented to person, place, and time [de-identified] : deferred

## 2022-10-05 NOTE — REVIEW OF SYSTEMS
[Fever] : no fever [Red Eyes] : eyes not red [Sore Throat] : no sore throat [Chest Pain] : no chest pain [Pelvic Pain] : no pelvic pain [Joint Stiffness] : no joint stiffness [Anxiety] : no anxiety [Muscle Weakness] : no muscle weakness [Easy Bleeding] : no tendency for easy bleeding

## 2022-10-05 NOTE — HISTORY OF PRESENT ILLNESS
[FreeTextEntry1] : 37 yo female with h/o constipation, with c/o rectal itching and  given proctosol with improvement of symptoms.\par rare brbpr on tissue paper. no weight loss. no n/v no gerd. no abdominal pain. patient seen by me in 2019 rectal exam revealed hemorrhoids . colonoscopy  recommended not done.\par \par never had a colonoscopy.

## 2022-10-05 NOTE — ASSESSMENT
[FreeTextEntry1] : constipation, rectal itching, hemorrhoids noted rectal exam, now with some brbpr, recommend colonoscopy r/o colon lesion \par \par plan miralax daily\par  colonoscopy to be scheduled\par  proctosol\par  consider dermatology evaluation of perianal and vaginal area\par \par \par Discussed risks including but not limited to bleeding,infection,drug reaction, perforation,missed lesion,benefits and alternatives of colonoscopy/egd with patient  including no\par treatment and patient consents to procedure.\par

## 2022-10-18 LAB — SARS-COV-2 N GENE NPH QL NAA+PROBE: DETECTED

## 2022-12-27 ENCOUNTER — APPOINTMENT (OUTPATIENT)
Dept: GASTROENTEROLOGY | Facility: CLINIC | Age: 37
End: 2022-12-27

## 2023-03-02 ENCOUNTER — OFFICE (OUTPATIENT)
Dept: URBAN - METROPOLITAN AREA CLINIC 90 | Facility: CLINIC | Age: 38
Setting detail: OPHTHALMOLOGY
End: 2023-03-02
Payer: COMMERCIAL

## 2023-03-02 DIAGNOSIS — H10.45: ICD-10-CM

## 2023-03-02 PROCEDURE — 92012 INTRM OPH EXAM EST PATIENT: CPT | Performed by: OPHTHALMOLOGY

## 2023-03-02 ASSESSMENT — AXIALLENGTH_DERIVED
OS_AL: 23.2748
OD_AL: 22.4053
OD_AL: 23.3196
OS_AL: 23.1804
OS_AL: 21.4007
OD_AL: 23.3196
OS_AL: 21.9769
OD_AL: 21.8484
OD_AL: 22.3178

## 2023-03-02 ASSESSMENT — REFRACTION_MANIFEST
OS_AXIS: 003
OD_AXIS: 011
OS_CYLINDER: -1.50
OD_VA1: 20/30+/-
OD_VA1: 20/30-2
OS_SPHERE: +1.00
OS_VA1: 20/60
OD_VA2: 20/40
OD_AXIS: 011
OS_VA2: 20/40
OU_VA: 20/30+2
OD_AXIS: 005
OS_VA1: 20/50+2
OD_AXIS: 005
OD_VA1: 20/25+2
OS_VA1: 20/60
OD_CYLINDER: -1.00
OD_ADD: +0.00
OS_VA1: 20/50-2
OS_CYLINDER: SPHERE
OS_CYLINDER: -1.50
OS_SPHERE: +3.00
OD_CYLINDER: -1.50
OD_AXIS: 011
OS_AXIS: 180
OD_SPHERE: PLANO
OS_SPHERE: +1.00
OD_CYLINDER: -0.50
OU_VA: 20/25
OS_ADD: +0.00
OD_SPHERE: PLANO
OD_SPHERE: +2.75
OD_CYLINDER: -0.50
OS_SPHERE: +0.75
OD_SPHERE: +0.50
OD_SPHERE: +0.50
OD_CYLINDER: -1.00
OS_CYLINDER: SPHERE
OS_CYLINDER: SPHERE
OS_SPHERE: +0.50
OD_VA1: 20/30+2
OS_AXIS: 018
OS_AXIS: 180

## 2023-03-02 ASSESSMENT — REFRACTION_AUTOREFRACTION
OD_CYLINDER: -1.00
OD_AXIS: 002
OS_CYLINDER: -1.25
OD_SPHERE: +4.75
OD_AXIS: 002
OS_AXIS: 011
OS_SPHERE: +6.00
OS_CYLINDER: -1.25
OD_CYLINDER: -1.25
OD_SPHERE: +3.25
OS_AXIS: 016
OS_SPHERE: +4.25

## 2023-03-02 ASSESSMENT — KERATOMETRY
OD_K2POWER_DIOPTERS: 45.25
OS_AXISANGLE_DEGREES: 097
OD_K1POWER_DIOPTERS: 43.25
METHOD_AUTO_MANUAL: AUTO
OS_K1POWER_DIOPTERS: 43.50
OD_AXISANGLE_DEGREES: 091
OS_K2POWER_DIOPTERS: 45.25

## 2023-03-02 ASSESSMENT — TONOMETRY
OD_IOP_MMHG: 15
OS_IOP_MMHG: 14

## 2023-03-02 ASSESSMENT — SPHEQUIV_DERIVED
OD_SPHEQUIV: 4.125
OD_SPHEQUIV: 2.75
OD_SPHEQUIV: 0
OS_SPHEQUIV: 0.25
OD_SPHEQUIV: 0
OD_SPHEQUIV: 2.5
OS_SPHEQUIV: 5.375
OS_SPHEQUIV: 3.625
OS_SPHEQUIV: 0

## 2023-03-02 ASSESSMENT — REFRACTION_CURRENTRX
OD_SPHERE: +0.50
OS_AXIS: 180
OD_AXIS: 011
OD_OVR_VA: 20/
OS_OVR_VA: 20/
OS_VPRISM_DIRECTION: SV
OD_VPRISM_DIRECTION: SV
OS_SPHERE: +1.00
OD_CYLINDER: -1.00
OS_CYLINDER: SPHERE

## 2023-03-02 ASSESSMENT — VISUAL ACUITY
OS_BCVA: 20/25-2
OD_BCVA: 20/60+2

## 2023-03-02 ASSESSMENT — PACHYMETRY
OD_CT_UM: 488
OS_CT_UM: 497
OD_CT_CORRECTION: 4
OS_CT_CORRECTION: 4

## 2023-03-02 ASSESSMENT — LID EXAM ASSESSMENTS: OD_COMMENTS: LID EVERSION NO FOREIGN BODY OR REACTION

## 2023-03-02 ASSESSMENT — CONFRONTATIONAL VISUAL FIELD TEST (CVF)
OD_FINDINGS: FULL
OS_FINDINGS: FULL

## 2023-03-14 ENCOUNTER — APPOINTMENT (OUTPATIENT)
Dept: INTERNAL MEDICINE | Facility: CLINIC | Age: 38
End: 2023-03-14
Payer: MEDICAID

## 2023-03-14 VITALS
OXYGEN SATURATION: 98 % | HEIGHT: 61 IN | DIASTOLIC BLOOD PRESSURE: 72 MMHG | BODY MASS INDEX: 32.47 KG/M2 | WEIGHT: 172 LBS | TEMPERATURE: 98.2 F | HEART RATE: 82 BPM | SYSTOLIC BLOOD PRESSURE: 134 MMHG

## 2023-03-14 DIAGNOSIS — M79.671 PAIN IN RIGHT FOOT: ICD-10-CM

## 2023-03-14 DIAGNOSIS — Z87.39 PERSONAL HISTORY OF OTHER DISEASES OF THE MUSCULOSKELETAL SYSTEM AND CONNECTIVE TISSUE: ICD-10-CM

## 2023-03-14 DIAGNOSIS — M79.89 OTHER SPECIFIED SOFT TISSUE DISORDERS: ICD-10-CM

## 2023-03-14 DIAGNOSIS — Z00.00 ENCOUNTER FOR GENERAL ADULT MEDICAL EXAMINATION W/OUT ABNORMAL FINDINGS: ICD-10-CM

## 2023-03-14 DIAGNOSIS — L65.9 NONSCARRING HAIR LOSS, UNSPECIFIED: ICD-10-CM

## 2023-03-14 DIAGNOSIS — Z87.19 PERSONAL HISTORY OF OTHER DISEASES OF THE DIGESTIVE SYSTEM: ICD-10-CM

## 2023-03-14 PROCEDURE — 99395 PREV VISIT EST AGE 18-39: CPT | Mod: 25

## 2023-03-14 RX ORDER — SODIUM BICARBONATE 84 MG/ML
8.4 INJECTION, SOLUTION INTRAVENOUS
Qty: 5 | Refills: 0 | Status: DISCONTINUED | COMMUNITY
Start: 2022-04-13 | End: 2023-03-14

## 2023-03-14 RX ORDER — SODIUM PICOSULFATE, MAGNESIUM OXIDE, AND ANHYDROUS CITRIC ACID 10; 3.5; 12 MG/160ML; G/160ML; G/160ML
10-3.5-12 MG-GM LIQUID ORAL
Qty: 1 | Refills: 0 | Status: DISCONTINUED | COMMUNITY
Start: 2022-10-05 | End: 2023-03-14

## 2023-03-14 RX ORDER — SODIUM SULFATE, POTASSIUM SULFATE AND MAGNESIUM SULFATE 1.6; 3.13; 17.5 G/177ML; G/177ML; G/177ML
17.5-3.13-1.6 SOLUTION ORAL
Qty: 1 | Refills: 0 | Status: DISCONTINUED | COMMUNITY
Start: 2022-10-10 | End: 2023-03-14

## 2023-03-14 RX ORDER — NYSTATIN 100000 1/G
100000 POWDER TOPICAL 3 TIMES DAILY
Qty: 1 | Refills: 1 | Status: DISCONTINUED | COMMUNITY
Start: 2022-08-16 | End: 2023-03-14

## 2023-03-14 RX ORDER — FERROUS GLUCONATE 324(37.5)
324 (37.5 FE) TABLET ORAL
Qty: 90 | Refills: 5 | Status: DISCONTINUED | COMMUNITY
Start: 2019-11-12 | End: 2023-03-14

## 2023-03-14 RX ORDER — TERCONAZOLE 8 MG/G
0.8 CREAM VAGINAL
Qty: 1 | Refills: 0 | Status: DISCONTINUED | COMMUNITY
Start: 2022-08-16 | End: 2023-03-14

## 2023-03-14 NOTE — HEALTH RISK ASSESSMENT
[Good] : ~his/her~  mood as  good [No] : In the past 12 months have you used drugs other than those required for medical reasons? 

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
## 2023-03-15 LAB
ALBUMIN SERPL ELPH-MCNC: 4.5 G/DL
ALP BLD-CCNC: 93 U/L
ALT SERPL-CCNC: 22 U/L
ANION GAP SERPL CALC-SCNC: 13 MMOL/L
APPEARANCE: CLEAR
AST SERPL-CCNC: 20 U/L
BASOPHILS # BLD AUTO: 0.07 K/UL
BASOPHILS NFR BLD AUTO: 0.5 %
BILIRUB SERPL-MCNC: 0.2 MG/DL
BILIRUBIN URINE: NEGATIVE
BLOOD URINE: NEGATIVE
BUN SERPL-MCNC: 10 MG/DL
CALCIUM SERPL-MCNC: 9.6 MG/DL
CHLORIDE SERPL-SCNC: 103 MMOL/L
CHOLEST SERPL-MCNC: 190 MG/DL
CO2 SERPL-SCNC: 24 MMOL/L
COLOR: NORMAL
CREAT SERPL-MCNC: 0.58 MG/DL
EGFR: 121 ML/MIN/1.73M2
EOSINOPHIL # BLD AUTO: 1.24 K/UL
EOSINOPHIL NFR BLD AUTO: 9.1 %
ESTIMATED AVERAGE GLUCOSE: 140 MG/DL
FERRITIN SERPL-MCNC: 20 NG/ML
GLUCOSE QUALITATIVE U: NEGATIVE
GLUCOSE SERPL-MCNC: 123 MG/DL
HBA1C MFR BLD HPLC: 6.5 %
HCT VFR BLD CALC: 35.5 %
HDLC SERPL-MCNC: 56 MG/DL
HGB BLD-MCNC: 10.8 G/DL
IMM GRANULOCYTES NFR BLD AUTO: 0.3 %
IRON SATN MFR SERPL: 6 %
IRON SERPL-MCNC: 23 UG/DL
KETONES URINE: NEGATIVE
LDLC SERPL CALC-MCNC: 110 MG/DL
LEUKOCYTE ESTERASE URINE: NEGATIVE
LYMPHOCYTES # BLD AUTO: 4.76 K/UL
LYMPHOCYTES NFR BLD AUTO: 34.8 %
MAN DIFF?: NORMAL
MCHC RBC-ENTMCNC: 24.4 PG
MCHC RBC-ENTMCNC: 30.4 GM/DL
MCV RBC AUTO: 80.3 FL
MONOCYTES # BLD AUTO: 0.67 K/UL
MONOCYTES NFR BLD AUTO: 4.9 %
NEUTROPHILS # BLD AUTO: 6.89 K/UL
NEUTROPHILS NFR BLD AUTO: 50.4 %
NITRITE URINE: NEGATIVE
NONHDLC SERPL-MCNC: 134 MG/DL
PH URINE: 7
PLATELET # BLD AUTO: 421 K/UL
POTASSIUM SERPL-SCNC: 4.4 MMOL/L
PROT SERPL-MCNC: 7.7 G/DL
PROTEIN URINE: NORMAL
RBC # BLD: 4.42 M/UL
RBC # FLD: 15.7 %
SODIUM SERPL-SCNC: 139 MMOL/L
SPECIFIC GRAVITY URINE: 1.02
T4 FREE SERPL-MCNC: 1.2 NG/DL
TIBC SERPL-MCNC: 380 UG/DL
TRIGL SERPL-MCNC: 122 MG/DL
TSH SERPL-ACNC: 1.98 UIU/ML
UIBC SERPL-MCNC: 357 UG/DL
UROBILINOGEN URINE: NORMAL
VIT B12 SERPL-MCNC: 542 PG/ML
WBC # FLD AUTO: 13.67 K/UL

## 2023-04-06 ENCOUNTER — OFFICE (OUTPATIENT)
Dept: URBAN - METROPOLITAN AREA CLINIC 90 | Facility: CLINIC | Age: 38
Setting detail: OPHTHALMOLOGY
End: 2023-04-06
Payer: COMMERCIAL

## 2023-04-06 DIAGNOSIS — D22.112: ICD-10-CM

## 2023-04-06 DIAGNOSIS — D22.122: ICD-10-CM

## 2023-04-06 DIAGNOSIS — H52.4: ICD-10-CM

## 2023-04-06 DIAGNOSIS — H40.013: ICD-10-CM

## 2023-04-06 PROCEDURE — 92012 INTRM OPH EXAM EST PATIENT: CPT | Performed by: OPHTHALMOLOGY

## 2023-04-06 PROCEDURE — 92133 CPTRZD OPH DX IMG PST SGM ON: CPT | Performed by: OPHTHALMOLOGY

## 2023-04-06 PROCEDURE — 92083 EXTENDED VISUAL FIELD XM: CPT | Performed by: OPHTHALMOLOGY

## 2023-04-06 ASSESSMENT — REFRACTION_AUTOREFRACTION
OS_SPHERE: +4.75
OD_CYLINDER: -1.25
OD_CYLINDER: -0.75
OD_SPHERE: +4.75
OS_AXIS: 017
OS_AXIS: 011
OS_CYLINDER: -1.25
OS_CYLINDER: -1.25
OD_SPHERE: +3.75
OD_AXIS: 002
OS_SPHERE: +6.00
OD_AXIS: 180

## 2023-04-06 ASSESSMENT — REFRACTION_MANIFEST
OD_VA1: 20/30+2
OS_SPHERE: +3.00
OD_SPHERE: +0.50
OD_ADD: +0.00
OS_CYLINDER: SPHERE
OS_VA1: 20/50+-
OD_VA2: 20/40
OS_SPHERE: +1.00
OS_CYLINDER: SPHERE
OD_AXIS: 011
OD_SPHERE: +2.75
OD_CYLINDER: -1.00
OS_SPHERE: +2.50
OS_VA1: 20/60
OS_ADD: +0.00
OD_CYLINDER: -1.00
OU_VA: 20/30+2
OS_SPHERE: +1.00
OU_VA: 20/25
OD_CYLINDER: -0.50
OD_CYLINDER: -1.00
OD_AXIS: 175
OD_VA2: 20/30-
OS_AXIS: 003
OD_ADD: +2.50
OS_AXIS: 010
OS_AXIS: 018
OS_VA1: 20/50-2
OD_CYLINDER: -0.75
OD_AXIS: 011
OD_AXIS: 005
OD_SPHERE: +0.50
OS_VA1: 20/60
OS_CYLINDER: -1.50
OS_VA2: 20/40
OD_SPHERE: PLANO
OS_CYLINDER: SPHERE
OS_AXIS: 180
OD_AXIS: 011
OS_CYLINDER: SPHERE
OD_SPHERE: +0.50
OS_SPHERE: +0.50
OD_VA1: 20/30 (SLOW)
OD_CYLINDER: -1.50
OD_SPHERE: PLANO
OD_VA1: 20/30+/-
OD_CYLINDER: -0.50
OS_CYLINDER: -1.50
OD_SPHERE: +0.50
OD_VA1: 20/30-2
OD_AXIS: 005
OS_CYLINDER: -1.50
OS_SPHERE: +1.00
OD_AXIS: 011
OS_AXIS: 180
OS_SPHERE: +0.75
OS_VA1: 20/50+2
OD_VA1: 20/25+2

## 2023-04-06 ASSESSMENT — VISUAL ACUITY
OD_BCVA: 20/50+2
OS_BCVA: 20/30+2

## 2023-04-06 ASSESSMENT — KERATOMETRY
OD_K1POWER_DIOPTERS: 43.25
OD_K2POWER_DIOPTERS: 44.75
METHOD_AUTO_MANUAL: AUTO
OS_K2POWER_DIOPTERS: 45.00
OS_AXISANGLE_DEGREES: 094
OS_K1POWER_DIOPTERS: 43.25
OD_AXISANGLE_DEGREES: 094

## 2023-04-06 ASSESSMENT — AXIALLENGTH_DERIVED
OD_AL: 23.4097
OS_AL: 21.4766
OD_AL: 23.4097
OD_AL: 22.4884
OD_AL: 23.4097
OD_AL: 21.9275
OS_AL: 23.2694
OS_AL: 21.8879
OD_AL: 23.3618
OD_AL: 22.1828
OS_AL: 22.7144
OS_AL: 23.3645

## 2023-04-06 ASSESSMENT — SPHEQUIV_DERIVED
OD_SPHEQUIV: 3.375
OD_SPHEQUIV: 0
OS_SPHEQUIV: 1.75
OS_SPHEQUIV: 0
OD_SPHEQUIV: 0
OD_SPHEQUIV: 0
OD_SPHEQUIV: 4.125
OS_SPHEQUIV: 4.125
OS_SPHEQUIV: 0.25
OD_SPHEQUIV: 0.125
OD_SPHEQUIV: 2.5
OS_SPHEQUIV: 5.375

## 2023-04-06 ASSESSMENT — REFRACTION_CURRENTRX
OS_AXIS: 180
OD_SPHERE: +1.75
OS_OVR_VA: 20/
OS_CYLINDER: SPHERE
OD_OVR_VA: 20/
OS_SPHERE: +1.00
OD_VPRISM_DIRECTION: SV
OS_SPHERE: +1.75
OS_CYLINDER: SPHERE
OD_OVR_VA: 20/
OS_VPRISM_DIRECTION: SV
OD_AXIS: 011
OS_VPRISM_DIRECTION: SV
OD_SPHERE: +0.50
OD_VPRISM_DIRECTION: SV
OD_CYLINDER: -1.00
OD_CYLINDER: SPHERE
OS_OVR_VA: 20/

## 2023-04-06 ASSESSMENT — TONOMETRY: OS_IOP_MMHG: 10

## 2023-04-06 ASSESSMENT — LID EXAM ASSESSMENTS: OD_COMMENTS: LID EVERSION NO FOREIGN BODY OR REACTION

## 2023-04-06 ASSESSMENT — PACHYMETRY
OS_CT_CORRECTION: 4
OD_CT_CORRECTION: 4
OS_CT_UM: 497
OD_CT_UM: 488

## 2023-04-06 ASSESSMENT — CONFRONTATIONAL VISUAL FIELD TEST (CVF)
OS_FINDINGS: FULL
OD_FINDINGS: FULL

## 2023-06-05 ENCOUNTER — NON-APPOINTMENT (OUTPATIENT)
Age: 38
End: 2023-06-05

## 2023-06-20 ENCOUNTER — APPOINTMENT (OUTPATIENT)
Dept: INTERNAL MEDICINE | Facility: CLINIC | Age: 38
End: 2023-06-20
Payer: MEDICAID

## 2023-06-20 VITALS
HEART RATE: 82 BPM | SYSTOLIC BLOOD PRESSURE: 100 MMHG | RESPIRATION RATE: 16 BRPM | BODY MASS INDEX: 31.91 KG/M2 | HEIGHT: 61 IN | WEIGHT: 169 LBS | DIASTOLIC BLOOD PRESSURE: 64 MMHG

## 2023-06-20 DIAGNOSIS — L64.9 ANDROGENIC ALOPECIA, UNSPECIFIED: ICD-10-CM

## 2023-06-20 DIAGNOSIS — R73.03 PREDIABETES.: ICD-10-CM

## 2023-06-20 DIAGNOSIS — D64.9 ANEMIA, UNSPECIFIED: ICD-10-CM

## 2023-06-20 DIAGNOSIS — L91.8 OTHER HYPERTROPHIC DISORDERS OF THE SKIN: ICD-10-CM

## 2023-06-20 PROCEDURE — 36415 COLL VENOUS BLD VENIPUNCTURE: CPT

## 2023-06-20 PROCEDURE — 99214 OFFICE O/P EST MOD 30 MIN: CPT | Mod: 25
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
## 2023-06-21 LAB
ESTIMATED AVERAGE GLUCOSE: 140 MG/DL
FERRITIN SERPL-MCNC: 18 NG/ML
HBA1C MFR BLD HPLC: 6.5 %
IRON SATN MFR SERPL: 6 %
IRON SERPL-MCNC: 20 UG/DL
TIBC SERPL-MCNC: 357 UG/DL
UIBC SERPL-MCNC: 337 UG/DL

## 2023-06-27 RX ORDER — FERROUS GLUCONATE 324(38)MG
324 (38 FE) TABLET ORAL 3 TIMES DAILY
Qty: 90 | Refills: 1 | Status: ACTIVE | COMMUNITY
Start: 2021-04-12 | End: 1900-01-01

## 2023-08-11 ENCOUNTER — APPOINTMENT (OUTPATIENT)
Dept: GASTROENTEROLOGY | Facility: CLINIC | Age: 38
End: 2023-08-11

## 2023-11-07 ENCOUNTER — APPOINTMENT (OUTPATIENT)
Dept: INTERNAL MEDICINE | Facility: CLINIC | Age: 38
End: 2023-11-07

## 2023-11-26 ENCOUNTER — NON-APPOINTMENT (OUTPATIENT)
Age: 38
End: 2023-11-26

## 2024-01-16 ENCOUNTER — NON-APPOINTMENT (OUTPATIENT)
Age: 39
End: 2024-01-16

## 2024-01-17 ENCOUNTER — EMERGENCY (EMERGENCY)
Facility: HOSPITAL | Age: 39
LOS: 1 days | Discharge: HOME | End: 2024-01-17
Attending: EMERGENCY MEDICINE | Admitting: EMERGENCY MEDICINE
Payer: COMMERCIAL

## 2024-01-17 VITALS
RESPIRATION RATE: 16 BRPM | SYSTOLIC BLOOD PRESSURE: 104 MMHG | HEART RATE: 86 BPM | TEMPERATURE: 99 F | OXYGEN SATURATION: 100 % | DIASTOLIC BLOOD PRESSURE: 66 MMHG

## 2024-01-17 VITALS
RESPIRATION RATE: 22 BRPM | TEMPERATURE: 100 F | HEART RATE: 101 BPM | SYSTOLIC BLOOD PRESSURE: 125 MMHG | OXYGEN SATURATION: 99 % | DIASTOLIC BLOOD PRESSURE: 71 MMHG

## 2024-01-17 DIAGNOSIS — Z98.89 OTHER SPECIFIED POSTPROCEDURAL STATES: Chronic | ICD-10-CM

## 2024-01-17 LAB
ALBUMIN SERPL ELPH-MCNC: 4 G/DL — SIGNIFICANT CHANGE UP (ref 3.3–5)
ALP SERPL-CCNC: 80 U/L — SIGNIFICANT CHANGE UP (ref 40–120)
ALT FLD-CCNC: 16 U/L — SIGNIFICANT CHANGE UP (ref 4–33)
ANION GAP SERPL CALC-SCNC: 13 MMOL/L — SIGNIFICANT CHANGE UP (ref 7–14)
AST SERPL-CCNC: 22 U/L — SIGNIFICANT CHANGE UP (ref 4–32)
B PERT DNA SPEC QL NAA+PROBE: SIGNIFICANT CHANGE UP
B PERT+PARAPERT DNA PNL SPEC NAA+PROBE: SIGNIFICANT CHANGE UP
BASE EXCESS BLDV CALC-SCNC: -3.1 MMOL/L — LOW (ref -2–3)
BASOPHILS # BLD AUTO: 0.01 K/UL — SIGNIFICANT CHANGE UP (ref 0–0.2)
BASOPHILS NFR BLD AUTO: 0.2 % — SIGNIFICANT CHANGE UP (ref 0–2)
BILIRUB SERPL-MCNC: 0.4 MG/DL — SIGNIFICANT CHANGE UP (ref 0.2–1.2)
BORDETELLA PARAPERTUSSIS (RAPRVP): SIGNIFICANT CHANGE UP
BUN SERPL-MCNC: 7 MG/DL — SIGNIFICANT CHANGE UP (ref 7–23)
C PNEUM DNA SPEC QL NAA+PROBE: SIGNIFICANT CHANGE UP
CA-I SERPL-SCNC: 1.22 MMOL/L — SIGNIFICANT CHANGE UP (ref 1.15–1.33)
CALCIUM SERPL-MCNC: 9.3 MG/DL — SIGNIFICANT CHANGE UP (ref 8.4–10.5)
CHLORIDE BLDV-SCNC: 105 MMOL/L — SIGNIFICANT CHANGE UP (ref 96–108)
CHLORIDE SERPL-SCNC: 103 MMOL/L — SIGNIFICANT CHANGE UP (ref 98–107)
CO2 BLDV-SCNC: 21.9 MMOL/L — LOW (ref 22–26)
CO2 SERPL-SCNC: 20 MMOL/L — LOW (ref 22–31)
CREAT SERPL-MCNC: 0.56 MG/DL — SIGNIFICANT CHANGE UP (ref 0.5–1.3)
EGFR: 121 ML/MIN/1.73M2 — SIGNIFICANT CHANGE UP
EOSINOPHIL # BLD AUTO: 0.03 K/UL — SIGNIFICANT CHANGE UP (ref 0–0.5)
EOSINOPHIL NFR BLD AUTO: 0.5 % — SIGNIFICANT CHANGE UP (ref 0–6)
FLUAV H1 2009 PAND RNA SPEC QL NAA+PROBE: DETECTED
FLUBV RNA SPEC QL NAA+PROBE: SIGNIFICANT CHANGE UP
GAS PNL BLDV: 134 MMOL/L — LOW (ref 136–145)
GAS PNL BLDV: SIGNIFICANT CHANGE UP
GAS PNL BLDV: SIGNIFICANT CHANGE UP
GLUCOSE BLDV-MCNC: 135 MG/DL — HIGH (ref 70–99)
GLUCOSE SERPL-MCNC: 143 MG/DL — HIGH (ref 70–99)
HADV DNA SPEC QL NAA+PROBE: SIGNIFICANT CHANGE UP
HCO3 BLDV-SCNC: 21 MMOL/L — LOW (ref 22–29)
HCOV 229E RNA SPEC QL NAA+PROBE: SIGNIFICANT CHANGE UP
HCOV HKU1 RNA SPEC QL NAA+PROBE: SIGNIFICANT CHANGE UP
HCOV NL63 RNA SPEC QL NAA+PROBE: SIGNIFICANT CHANGE UP
HCOV OC43 RNA SPEC QL NAA+PROBE: SIGNIFICANT CHANGE UP
HCT VFR BLD CALC: 32.8 % — LOW (ref 34.5–45)
HCT VFR BLDA CALC: 32 % — LOW (ref 34.5–46.5)
HGB BLD CALC-MCNC: 10.5 G/DL — LOW (ref 11.7–16.1)
HGB BLD-MCNC: 10.5 G/DL — LOW (ref 11.5–15.5)
HMPV RNA SPEC QL NAA+PROBE: SIGNIFICANT CHANGE UP
HPIV1 RNA SPEC QL NAA+PROBE: SIGNIFICANT CHANGE UP
HPIV2 RNA SPEC QL NAA+PROBE: SIGNIFICANT CHANGE UP
HPIV3 RNA SPEC QL NAA+PROBE: SIGNIFICANT CHANGE UP
HPIV4 RNA SPEC QL NAA+PROBE: SIGNIFICANT CHANGE UP
IANC: 5.55 K/UL — SIGNIFICANT CHANGE UP (ref 1.8–7.4)
IMM GRANULOCYTES NFR BLD AUTO: 0.6 % — SIGNIFICANT CHANGE UP (ref 0–0.9)
LACTATE BLDV-MCNC: 1.3 MMOL/L — SIGNIFICANT CHANGE UP (ref 0.5–2)
LYMPHOCYTES # BLD AUTO: 0.52 K/UL — LOW (ref 1–3.3)
LYMPHOCYTES # BLD AUTO: 8.2 % — LOW (ref 13–44)
M PNEUMO DNA SPEC QL NAA+PROBE: SIGNIFICANT CHANGE UP
MCHC RBC-ENTMCNC: 24.6 PG — LOW (ref 27–34)
MCHC RBC-ENTMCNC: 32 GM/DL — SIGNIFICANT CHANGE UP (ref 32–36)
MCV RBC AUTO: 77 FL — LOW (ref 80–100)
MONOCYTES # BLD AUTO: 0.23 K/UL — SIGNIFICANT CHANGE UP (ref 0–0.9)
MONOCYTES NFR BLD AUTO: 3.6 % — SIGNIFICANT CHANGE UP (ref 2–14)
NEUTROPHILS # BLD AUTO: 5.55 K/UL — SIGNIFICANT CHANGE UP (ref 1.8–7.4)
NEUTROPHILS NFR BLD AUTO: 86.9 % — HIGH (ref 43–77)
NRBC # BLD: 0 /100 WBCS — SIGNIFICANT CHANGE UP (ref 0–0)
NRBC # FLD: 0 K/UL — SIGNIFICANT CHANGE UP (ref 0–0)
PCO2 BLDV: 33 MMHG — LOW (ref 39–52)
PH BLDV: 7.41 — SIGNIFICANT CHANGE UP (ref 7.32–7.43)
PLATELET # BLD AUTO: 305 K/UL — SIGNIFICANT CHANGE UP (ref 150–400)
PO2 BLDV: 92 MMHG — HIGH (ref 25–45)
POTASSIUM BLDV-SCNC: 3.5 MMOL/L — SIGNIFICANT CHANGE UP (ref 3.5–5.1)
POTASSIUM SERPL-MCNC: 3.7 MMOL/L — SIGNIFICANT CHANGE UP (ref 3.5–5.3)
POTASSIUM SERPL-SCNC: 3.7 MMOL/L — SIGNIFICANT CHANGE UP (ref 3.5–5.3)
PROT SERPL-MCNC: 7.8 G/DL — SIGNIFICANT CHANGE UP (ref 6–8.3)
RAPID RVP RESULT: DETECTED
RBC # BLD: 4.26 M/UL — SIGNIFICANT CHANGE UP (ref 3.8–5.2)
RBC # FLD: 14.7 % — HIGH (ref 10.3–14.5)
RSV RNA SPEC QL NAA+PROBE: SIGNIFICANT CHANGE UP
RV+EV RNA SPEC QL NAA+PROBE: SIGNIFICANT CHANGE UP
SAO2 % BLDV: 96.5 % — HIGH (ref 67–88)
SARS-COV-2 RNA SPEC QL NAA+PROBE: SIGNIFICANT CHANGE UP
SODIUM SERPL-SCNC: 136 MMOL/L — SIGNIFICANT CHANGE UP (ref 135–145)
WBC # BLD: 6.38 K/UL — SIGNIFICANT CHANGE UP (ref 3.8–10.5)
WBC # FLD AUTO: 6.38 K/UL — SIGNIFICANT CHANGE UP (ref 3.8–10.5)

## 2024-01-17 PROCEDURE — 71046 X-RAY EXAM CHEST 2 VIEWS: CPT | Mod: 26

## 2024-01-17 PROCEDURE — 99284 EMERGENCY DEPT VISIT MOD MDM: CPT

## 2024-01-17 RX ORDER — ACETAMINOPHEN 500 MG
1000 TABLET ORAL ONCE
Refills: 0 | Status: COMPLETED | OUTPATIENT
Start: 2024-01-17 | End: 2024-01-17

## 2024-01-17 RX ORDER — SODIUM CHLORIDE 9 MG/ML
1000 INJECTION INTRAMUSCULAR; INTRAVENOUS; SUBCUTANEOUS ONCE
Refills: 0 | Status: COMPLETED | OUTPATIENT
Start: 2024-01-17 | End: 2024-01-17

## 2024-01-17 RX ORDER — ALBUTEROL 90 UG/1
2 AEROSOL, METERED ORAL
Qty: 1 | Refills: 0
Start: 2024-01-17 | End: 2024-01-19

## 2024-01-17 RX ORDER — IPRATROPIUM/ALBUTEROL SULFATE 18-103MCG
3 AEROSOL WITH ADAPTER (GRAM) INHALATION ONCE
Refills: 0 | Status: COMPLETED | OUTPATIENT
Start: 2024-01-17 | End: 2024-01-17

## 2024-01-17 RX ADMIN — Medication 3 MILLILITER(S): at 14:16

## 2024-01-17 RX ADMIN — Medication 400 MILLIGRAM(S): at 14:15

## 2024-01-17 RX ADMIN — SODIUM CHLORIDE 1000 MILLILITER(S): 9 INJECTION INTRAMUSCULAR; INTRAVENOUS; SUBCUTANEOUS at 14:15

## 2024-01-17 NOTE — ED PROVIDER NOTE - PATIENT PORTAL LINK FT
You can access the FollowMyHealth Patient Portal offered by [facility] by registering at the following website: [email or web address]. By joining [facility] FollowMyHealth portal, you will also be able to view your health information using other applications (apps) compatible with our system.

## 2024-01-17 NOTE — ED ADULT TRIAGE NOTE - CHIEF COMPLAINT QUOTE
pt coming from Urgent care as per EMS pt and her 3 children have been sick x 4 days, cough, fever of 101.5 F tylenol and resp. nebs x 2 give, Fs 115 prior to arrival. L hand 20g angio cath place by EMS

## 2024-01-17 NOTE — ED PROVIDER NOTE - NSFOLLOWUPINSTRUCTIONS_ED_ALL_ED_FT
You have been evaluated in the Emergency Department today for fevers, shortness of breath.    Influenza (flu) is an infection in the lungs and breathing passages. It is caused by the influenza virus. There are different strains, or types, of the flu virus from year to year. Unlike the common cold, the flu comes on suddenly and the symptoms can be more severe. These symptoms include a cough, congestion, fever, chills, fatigue, aches, and pains. These symptoms may last up to 10 days. Although the flu can make you feel very sick, it usually doesn't cause serious health problems.    Home treatment is usually all you need for flu symptoms. But your doctor may prescribe antiviral medicine to prevent other health problems, such as pneumonia, from developing. The risk of other health problems from the flu is highest for young children (under 5), older adults (over 65), pregnant women, people with long-term health conditions, people who live in nursing homes or long-term care centres, and indigenous peoples.    Follow-up care is a key part of your treatment and safety. Be sure to make and go to all appointments, and call your doctor or nurse advice line ([phone] in most provinces and [facility]) if you are having problems. It's also a good idea to know your test results and keep a list of the medicines you take.    How can you care for yourself at home?  - Get plenty of rest.  - Drink plenty of fluids. If you have to limit fluids because of a health problem, talk with your doctor before you increase the amount of fluids you drink.  - Take an over-the-counter pain medicine if needed, such as acetaminophen (Tylenol), ibuprofen (Advil, Motrin), or naproxen (Aleve), to relieve fever, headache, and muscle aches. Read and follow all instructions on the label.  - No one younger than 18 should take aspirin. It has been linked to Reye syndrome, a serious illness.  - Take any prescribed medicine exactly as directed.  - Do not smoke. Smoking can make the flu worse. If you need help quitting, talk to your doctor about stop-smoking programs and medicines. These can increase your chances of quitting for good.  If the skin around your nose and lips becomes sore, put some petroleum jelly (such as Vaseline) on the area.    To ease coughing:  - Suck on cough drops or plain, hard candy.  - Try an over-the-counter cough or cold medicine. Read and follow all instructions on the label.  - Raise your head at night with an extra pillow. This may help you rest if coughing keeps you awake.    To avoid spreading the flu  - Wash your hands regularly, and keep your hands away from your face.  - Stay home from school, work, and other public places until you are feeling better and your fever has been gone for at least 24 hours. The fever needs to have gone away on its own without the help of medicine.  - Ask people living with you to talk to their doctors about preventing the flu. They may get antiviral medicine to keep from getting the flu from you.  - To prevent the flu in the future, get the flu vaccine every fall. Encourage people living with you to get the vaccine.   - Cover your mouth when you cough or sneeze. If you can, cough or sneeze into the bend of your elbow, not your hands.

## 2024-01-17 NOTE — ED PROVIDER NOTE - CLINICAL SUMMARY MEDICAL DECISION MAKING FREE TEXT BOX
Kayla: Recent flight to [facility] / [facility]. 2 family members w/ flu. P/w cough, SOB, F that's exacerbating her asthma. Got steroids and nebs at urgent care. Give more nebs here. CXR, CBC, CMP, RVP.

## 2024-01-17 NOTE — ED PROVIDER NOTE - OBJECTIVE STATEMENT
39 y/o female with pmh of asthma presents to the ED for flu like symptoms, onset 4 days. She endorses cough, shortness of breath, fevers, and headache. She went to urgent care earlier today, was given steroids and 2 duoneb treatments. She states her two kids at home tested positive for the FLU. Of note, she returned from [place] yesterday. She denies vomiting, diarrhea or constipation.

## 2024-01-17 NOTE — ED PROVIDER NOTE - PROGRESS NOTE DETAILS
Winifred Knowles PGY1: re-assessed patient. on PE, not tight. moving air well. No wheezing.  patient states she is feeling better.   will continue to monitor.

## 2024-01-17 NOTE — ED PROVIDER NOTE - NSICDXFAMILYHX_GEN_ALL_CORE_FT
FAMILY HISTORY:  Father  Still living? Unknown  Family history of chronic arthritis, Age at diagnosis: Age Unknown  Family history of chronic kidney disease, Age at diagnosis: Age Unknown  Family history of diabetes mellitus, Age at diagnosis: Age Unknown  Family history of heart disease, Age at diagnosis: Age Unknown

## 2024-01-17 NOTE — ED PROVIDER NOTE - ATTENDING CONTRIBUTION TO CARE
I performed a face-to-face evaluation of the patient and performed a history and physical examination. I agree with the history and physical examination. If this was a PA visit, I personally saw the patient with the PA and performed a substantive portion of the visit including all aspects of the medical decision making.    Kayla: Recent flight to [facility]. 2 family members w/ flu. P/w cough, SOB, F that's exacerbating her asthma. Got steroids and nebs at urgent care. Give more nebs here. CXR, CBC, CMP, RVP.

## 2024-01-17 NOTE — ED PROVIDER NOTE - PHYSICAL EXAMINATION
Physical Exam:  Gen:  uncomfortable appearing, awake, alert,   Head: NCAT  HEENT: Normal conjunctiva, trachea midline, moist mucous membranes  Lung: mild no respiratory distress, + wheezes, tight.   CV: RRR, no murmurs, rubs or gallops  Abd: Soft, non-tender, non-distended,   MSK: No visible deformities, moves all four extremities, no muscle or joint tenderness  Neuro: No focal motor deficits  Skin: Warm, well perfused,   Psych: appropriate affect and mood

## 2024-01-17 NOTE — ED ADULT NURSE NOTE - OBJECTIVE STATEMENT
patient [id number] came in c/o flu like symptoms since 4 days. states he children have flu . patient was seen at urgent care and was given nebulizerx2, oral steroids prior to arrival. breathing even and unlabored, saline lock to left hand by EMS with 20 Angiocath intact, flushes well. labs drawn and meds given as ordered. awaiting results.

## 2024-01-22 RX ORDER — ALBUTEROL SULFATE 90 UG/1
108 (90 BASE) INHALANT RESPIRATORY (INHALATION)
Qty: 1 | Refills: 5 | Status: ACTIVE | COMMUNITY
Start: 2020-09-14 | End: 1900-01-01

## 2024-02-06 ENCOUNTER — APPOINTMENT (OUTPATIENT)
Dept: INTERNAL MEDICINE | Facility: CLINIC | Age: 39
End: 2024-02-06

## 2024-07-02 ENCOUNTER — NON-APPOINTMENT (OUTPATIENT)
Age: 39
End: 2024-07-02

## 2024-07-03 ENCOUNTER — APPOINTMENT (OUTPATIENT)
Dept: OBGYN | Facility: CLINIC | Age: 39
End: 2024-07-03
Payer: COMMERCIAL

## 2024-07-03 VITALS
HEART RATE: 87 BPM | BODY MASS INDEX: 30.58 KG/M2 | HEIGHT: 61 IN | WEIGHT: 162 LBS | DIASTOLIC BLOOD PRESSURE: 76 MMHG | SYSTOLIC BLOOD PRESSURE: 123 MMHG

## 2024-07-03 DIAGNOSIS — Z01.419 ENCOUNTER FOR GYNECOLOGICAL EXAMINATION (GENERAL) (ROUTINE) W/OUT ABNORMAL FINDINGS: ICD-10-CM

## 2024-07-03 DIAGNOSIS — R93.89 ABNORMAL FINDINGS ON DIAGNOSTIC IMAGING OF OTHER SPECIFIED BODY STRUCTURES: ICD-10-CM

## 2024-07-03 PROCEDURE — 99459 PELVIC EXAMINATION: CPT

## 2024-07-03 PROCEDURE — 99395 PREV VISIT EST AGE 18-39: CPT

## 2024-08-09 ENCOUNTER — APPOINTMENT (OUTPATIENT)
Dept: INTERNAL MEDICINE | Facility: CLINIC | Age: 39
End: 2024-08-09

## 2024-08-09 PROCEDURE — 36415 COLL VENOUS BLD VENIPUNCTURE: CPT

## 2024-08-09 PROCEDURE — 99395 PREV VISIT EST AGE 18-39: CPT
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
## 2024-08-09 NOTE — PHYSICAL EXAM
[No Acute Distress] : no acute distress [Normal Sclera/Conjunctiva] : normal sclera/conjunctiva [PERRL] : pupils equal round and reactive to light [EOMI] : extraocular movements intact [Normal Outer Ear/Nose] : the outer ears and nose were normal in appearance [Normal Oropharynx] : the oropharynx was normal [No JVD] : no jugular venous distention [No Lymphadenopathy] : no lymphadenopathy [Supple] : supple [Thyroid Normal, No Nodules] : the thyroid was normal and there were no nodules present [No Respiratory Distress] : no respiratory distress  [No Accessory Muscle Use] : no accessory muscle use [Clear to Auscultation] : lungs were clear to auscultation bilaterally [Normal Rate] : normal rate  [Regular Rhythm] : with a regular rhythm [Normal S1, S2] : normal S1 and S2 [No Murmur] : no murmur heard [No Varicosities] : no varicosities [Pedal Pulses Present] : the pedal pulses are present [No Edema] : there was no peripheral edema [No Extremity Clubbing/Cyanosis] : no extremity clubbing/cyanosis [Soft] : abdomen soft [Non Tender] : non-tender [Non-distended] : non-distended [No Masses] : no abdominal mass palpated [No HSM] : no HSM [Normal Bowel Sounds] : normal bowel sounds [Normal Posterior Cervical Nodes] : no posterior cervical lymphadenopathy [Normal Anterior Cervical Nodes] : no anterior cervical lymphadenopathy [No CVA Tenderness] : no CVA  tenderness [No Spinal Tenderness] : no spinal tenderness [No Joint Swelling] : no joint swelling [Grossly Normal Strength/Tone] : grossly normal strength/tone [No Rash] : no rash [Coordination Grossly Intact] : coordination grossly intact [No Focal Deficits] : no focal deficits [Normal Gait] : normal gait [Deep Tendon Reflexes (DTR)] : deep tendon reflexes were 2+ and symmetric [Normal Affect] : the affect was normal [Normal Insight/Judgement] : insight and judgment were intact

## 2024-08-09 NOTE — HEALTH RISK ASSESSMENT
[Good] : ~his/her~  mood as  good [No] : In the past 12 months have you used drugs other than those required for medical reasons? No [0] : 2) Feeling down, depressed, or hopeless: Not at all (0) [PHQ-2 Negative - No further assessment needed] : PHQ-2 Negative - No further assessment needed [Never] : Never [With Significant Other] : lives with significant other [Employed] : employed [] :  [Fully functional (bathing, dressing, toileting, transferring, walking, feeding)] : Fully functional (bathing, dressing, toileting, transferring, walking, feeding) [Fully functional (using the telephone, shopping, preparing meals, housekeeping, doing laundry, using] : Fully functional and needs no help or supervision to perform IADLs (using the telephone, shopping, preparing meals, housekeeping, doing laundry, using transportation, managing medications and managing finances) [[id number]] : 0 [de-identified] : Sedentary  [Reports changes in hearing] : Reports no changes in hearing [Reports changes in dental health] : Reports no changes in dental health [Reports changes in vision] : Reports no changes in vision [

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
## 2024-08-12 RX ORDER — MULTIVIT-MIN/IRON/FOLIC ACID/K 18-600-40
500 CAPSULE ORAL
Qty: 30 | Refills: 6 | Status: ACTIVE | COMMUNITY
Start: 2024-08-12 | End: 1900-01-01

## 2024-08-12 RX ORDER — FERROUS GLUCONATE 324(37.5)
324 (37.5 FE) TABLET ORAL
Qty: 30 | Refills: 6 | Status: ACTIVE | COMMUNITY
Start: 2024-08-12 | End: 1900-01-01

## 2024-08-12 RX ORDER — ERGOCALCIFEROL 1.25 MG/1
1.25 MG CAPSULE, LIQUID FILLED ORAL
Qty: 12 | Refills: 1 | Status: ACTIVE | COMMUNITY
Start: 2024-08-12 | End: 1900-01-01

## 2024-11-13 ENCOUNTER — OFFICE (OUTPATIENT)
Facility: LOCATION | Age: 39
Setting detail: OPHTHALMOLOGY
End: 2024-11-13
Payer: COMMERCIAL

## 2024-11-13 DIAGNOSIS — H50.51: ICD-10-CM

## 2024-11-13 DIAGNOSIS — D22.112: ICD-10-CM

## 2024-11-13 DIAGNOSIS — H52.03: ICD-10-CM

## 2024-11-13 DIAGNOSIS — H40.013: ICD-10-CM

## 2024-11-13 DIAGNOSIS — D22.122: ICD-10-CM

## 2024-11-13 DIAGNOSIS — H53.022: ICD-10-CM

## 2024-11-13 PROBLEM — H25.13 CATARACT SENILE NUCLEAR SCLEROSIS; BOTH EYES: Status: ACTIVE | Noted: 2024-11-13

## 2024-11-13 PROCEDURE — 92060 SENSORIMOTOR EXAMINATION: CPT | Performed by: OPHTHALMOLOGY

## 2024-11-13 PROCEDURE — 92014 COMPRE OPH EXAM EST PT 1/>: CPT | Performed by: OPHTHALMOLOGY

## 2024-11-13 PROCEDURE — 92133 CPTRZD OPH DX IMG PST SGM ON: CPT | Performed by: OPHTHALMOLOGY

## 2024-11-13 PROCEDURE — 92083 EXTENDED VISUAL FIELD XM: CPT | Performed by: OPHTHALMOLOGY

## 2024-11-13 PROCEDURE — 92015 DETERMINE REFRACTIVE STATE: CPT | Performed by: OPHTHALMOLOGY

## 2024-11-13 ASSESSMENT — REFRACTION_MANIFEST
OD_CYLINDER: -1.00
OD_CYLINDER: -0.75
OS_CYLINDER: -1.50
OS_CYLINDER: -1.50
OD_ADD: +2.50
OS_AXIS: 010
OD_SPHERE: +0.50
OS_SPHERE: +2.50
OD_VA2: 20/30-
OS_VA1: 20/50+-
OD_VA1: 20/30 (SLOW)
OS_AXIS: 010
OD_SPHERE: +1.25
OD_AXIS: 175
OD_VA1: 20/20-2
OS_VA1: 20/30-2
OD_AXIS: 180
OS_SPHERE: +3.00

## 2024-11-13 ASSESSMENT — REFRACTION_CURRENTRX
OS_CYLINDER: SPH
OS_OVR_VA: 20/
OS_CYLINDER: SPHERE
OS_VPRISM_DIRECTION: SV
OD_OVR_VA: 20/
OS_AXIS: 180
OD_SPHERE: +0.75
OD_OVR_VA: 20/
OS_SPHERE: +1.75
OD_AXIS: 017
OS_SPHERE: +1.00
OS_VPRISM_DIRECTION: SV
OD_CYLINDER: SPH
OD_VPRISM_DIRECTION: SV
OD_CYLINDER: -1.00
OS_OVR_VA: 20/
OD_SPHERE: +1.75
OD_VPRISM_DIRECTION: SV

## 2024-11-13 ASSESSMENT — KERATOMETRY
OS_K1POWER_DIOPTERS: 43.25
OD_K1POWER_DIOPTERS: 43.25
METHOD_AUTO_MANUAL: AUTO
OD_AXISANGLE_DEGREES: 095
OS_AXISANGLE_DEGREES: 095
OD_K2POWER_DIOPTERS: 45.25
OS_K2POWER_DIOPTERS: 45.00

## 2024-11-13 ASSESSMENT — CONFRONTATIONAL VISUAL FIELD TEST (CVF)
OD_FINDINGS: FULL
OS_FINDINGS: FULL

## 2024-11-13 ASSESSMENT — REFRACTION_AUTOREFRACTION
OD_SPHERE: +3.75
OD_CYLINDER: -1.50
OS_AXIS: 008
OD_CYLINDER: -1.00
OD_SPHERE: +5.00
OS_AXIS: 006
OD_AXIS: 003
OS_CYLINDER: -1.50
OS_SPHERE: +6.00
OS_CYLINDER: -1.50
OD_AXIS: 178
OS_SPHERE: +5.25

## 2024-11-13 ASSESSMENT — VISUAL ACUITY
OS_BCVA: 20/30
OD_BCVA: 20/50

## 2024-11-15 ENCOUNTER — APPOINTMENT (OUTPATIENT)
Dept: INTERNAL MEDICINE | Facility: CLINIC | Age: 39
End: 2024-11-15
Payer: COMMERCIAL

## 2024-11-15 VITALS
HEIGHT: 61 IN | HEART RATE: 89 BPM | DIASTOLIC BLOOD PRESSURE: 69 MMHG | BODY MASS INDEX: 32.28 KG/M2 | OXYGEN SATURATION: 96 % | SYSTOLIC BLOOD PRESSURE: 115 MMHG | WEIGHT: 171 LBS | TEMPERATURE: 98.2 F

## 2024-11-15 DIAGNOSIS — D64.9 ANEMIA, UNSPECIFIED: ICD-10-CM

## 2024-11-15 DIAGNOSIS — R73.03 PREDIABETES.: ICD-10-CM

## 2024-11-15 DIAGNOSIS — E55.9 VITAMIN D DEFICIENCY, UNSPECIFIED: ICD-10-CM

## 2024-11-15 DIAGNOSIS — N76.0 ACUTE VAGINITIS: ICD-10-CM

## 2024-11-15 DIAGNOSIS — J45.909 UNSPECIFIED ASTHMA, UNCOMPLICATED: ICD-10-CM

## 2024-11-15 DIAGNOSIS — R00.2 PALPITATIONS: ICD-10-CM

## 2024-11-15 PROCEDURE — 36415 COLL VENOUS BLD VENIPUNCTURE: CPT

## 2024-11-15 PROCEDURE — 99214 OFFICE O/P EST MOD 30 MIN: CPT

## 2024-11-15 RX ORDER — NYSTATIN 100000 [USP'U]/G
100000 POWDER TOPICAL 3 TIMES DAILY
Qty: 1 | Refills: 0 | Status: ACTIVE | COMMUNITY
Start: 2024-11-15 | End: 1900-01-01

## 2024-11-18 LAB
ESTIMATED AVERAGE GLUCOSE: 134 MG/DL
FERRITIN SERPL-MCNC: 34 NG/ML
HBA1C MFR BLD HPLC: 6.3 %
HCT VFR BLD CALC: 37.9 %
HGB BLD-MCNC: 11.7 G/DL
IRON SATN MFR SERPL: 11 %
IRON SERPL-MCNC: 40 UG/DL
MCHC RBC-ENTMCNC: 25.4 PG
MCHC RBC-ENTMCNC: 30.9 G/DL
MCV RBC AUTO: 82.2 FL
PLATELET # BLD AUTO: 387 K/UL
RBC # BLD: 4.61 M/UL
RBC # FLD: 16.2 %
TIBC SERPL-MCNC: 367 UG/DL
UIBC SERPL-MCNC: 326 UG/DL
WBC # FLD AUTO: 11.16 K/UL

## 2024-12-24 PROBLEM — F10.90 ALCOHOL USE: Status: INACTIVE | Noted: 2020-11-04

## 2025-04-23 NOTE — PAST MEDICAL HISTORY
- Has right anterior pre-tibial scab, wash gently daily, leave open to air  - Wound care RN consulted and following   [Menarche Age ____] : age at menarche was [unfilled] [Definite ___ (Date)] : the last menstrual period was [unfilled] [Normal Duration] : the duration was normal [Regular Cycle Intervals] : have been regular [Dysmenorrhea] : dysmenorrhea [Total Preg ___] : G: [unfilled] [Living ___] : Living: [unfilled]

## 2025-07-08 ENCOUNTER — APPOINTMENT (OUTPATIENT)
Dept: INTERNAL MEDICINE | Facility: CLINIC | Age: 40
End: 2025-07-08

## 2025-08-28 ENCOUNTER — APPOINTMENT (OUTPATIENT)
Dept: INTERNAL MEDICINE | Facility: CLINIC | Age: 40
End: 2025-08-28

## 2025-08-28 VITALS
OXYGEN SATURATION: 98 % | TEMPERATURE: 97.9 F | HEART RATE: 94 BPM | SYSTOLIC BLOOD PRESSURE: 111 MMHG | DIASTOLIC BLOOD PRESSURE: 73 MMHG | WEIGHT: 172 LBS | RESPIRATION RATE: 15 BRPM | HEIGHT: 61 IN | BODY MASS INDEX: 32.47 KG/M2

## 2025-08-28 DIAGNOSIS — J45.909 UNSPECIFIED ASTHMA, UNCOMPLICATED: ICD-10-CM

## 2025-08-28 DIAGNOSIS — R06.2 WHEEZING: ICD-10-CM

## 2025-08-28 PROCEDURE — 99214 OFFICE O/P EST MOD 30 MIN: CPT

## 2025-08-28 RX ORDER — PREDNISONE 20 MG/1
20 TABLET ORAL
Qty: 10 | Refills: 0 | Status: ACTIVE | COMMUNITY
Start: 2025-08-28 | End: 1900-01-01

## 2025-08-28 RX ORDER — CETIRIZINE HYDROCHLORIDE 10 MG/1
10 TABLET, COATED ORAL
Qty: 90 | Refills: 3 | Status: ACTIVE | COMMUNITY
Start: 2025-08-28 | End: 1900-01-01

## 2025-08-28 RX ORDER — BUDESONIDE AND FORMOTEROL FUMARATE DIHYDRATE 80; 4.5 UG/1; UG/1
80-4.5 AEROSOL RESPIRATORY (INHALATION)
Qty: 1 | Refills: 1 | Status: ACTIVE | COMMUNITY
Start: 2025-08-28 | End: 1900-01-01

## 2025-09-08 ENCOUNTER — APPOINTMENT (OUTPATIENT)
Dept: INTERNAL MEDICINE | Facility: CLINIC | Age: 40
End: 2025-09-08

## 2025-09-11 ENCOUNTER — APPOINTMENT (OUTPATIENT)
Dept: PULMONOLOGY | Facility: CLINIC | Age: 40
End: 2025-09-11